# Patient Record
Sex: FEMALE | NOT HISPANIC OR LATINO | URBAN - METROPOLITAN AREA
[De-identification: names, ages, dates, MRNs, and addresses within clinical notes are randomized per-mention and may not be internally consistent; named-entity substitution may affect disease eponyms.]

---

## 2017-02-08 ENCOUNTER — OUTPATIENT (OUTPATIENT)
Dept: OUTPATIENT SERVICES | Facility: HOSPITAL | Age: 27
LOS: 1 days | End: 2017-02-08

## 2017-02-08 DIAGNOSIS — O26.899 OTHER SPECIFIED PREGNANCY RELATED CONDITIONS, UNSPECIFIED TRIMESTER: ICD-10-CM

## 2017-02-08 DIAGNOSIS — Z3A.00 WEEKS OF GESTATION OF PREGNANCY NOT SPECIFIED: ICD-10-CM

## 2017-02-08 RX ORDER — SODIUM CHLORIDE 9 MG/ML
1000 INJECTION, SOLUTION INTRAVENOUS
Qty: 0 | Refills: 0 | Status: DISCONTINUED | OUTPATIENT
Start: 2017-02-08 | End: 2017-02-08

## 2017-02-08 RX ORDER — SODIUM CHLORIDE 9 MG/ML
1000 INJECTION, SOLUTION INTRAVENOUS
Qty: 0 | Refills: 0 | Status: COMPLETED | OUTPATIENT
Start: 2017-02-08 | End: 2017-02-08

## 2017-02-08 RX ADMIN — SODIUM CHLORIDE 999 MILLILITER(S): 9 INJECTION, SOLUTION INTRAVENOUS at 14:49

## 2017-02-09 ENCOUNTER — INPATIENT (INPATIENT)
Facility: HOSPITAL | Age: 27
LOS: 3 days | Discharge: HOME | End: 2017-02-13
Attending: OBSTETRICS & GYNECOLOGY | Admitting: OBSTETRICS & GYNECOLOGY
Payer: MEDICAID

## 2017-02-09 ENCOUNTER — RESULT REVIEW (OUTPATIENT)
Age: 27
End: 2017-02-09

## 2017-02-09 VITALS — WEIGHT: 235.89 LBS | HEIGHT: 64 IN

## 2017-02-09 DIAGNOSIS — Z3A.00 WEEKS OF GESTATION OF PREGNANCY NOT SPECIFIED: ICD-10-CM

## 2017-02-09 DIAGNOSIS — O26.899 OTHER SPECIFIED PREGNANCY RELATED CONDITIONS, UNSPECIFIED TRIMESTER: ICD-10-CM

## 2017-02-09 LAB
BASOPHILS # BLD AUTO: 0.01 K/UL — SIGNIFICANT CHANGE UP (ref 0–0.2)
BASOPHILS NFR BLD AUTO: 0.1 % — SIGNIFICANT CHANGE UP (ref 0–2)
BLD GP AB SCN SERPL QL: NEGATIVE — SIGNIFICANT CHANGE UP
EOSINOPHIL # BLD AUTO: 0.04 K/UL — SIGNIFICANT CHANGE UP (ref 0–0.5)
EOSINOPHIL NFR BLD AUTO: 0.5 % — SIGNIFICANT CHANGE UP (ref 0–6)
HCT VFR BLD CALC: 36.8 % — SIGNIFICANT CHANGE UP (ref 34.5–45)
HGB BLD-MCNC: 12.1 G/DL — SIGNIFICANT CHANGE UP (ref 11.5–15.5)
IMM GRANULOCYTES NFR BLD AUTO: 0.1 % — SIGNIFICANT CHANGE UP (ref 0–1.5)
LYMPHOCYTES # BLD AUTO: 1.51 K/UL — SIGNIFICANT CHANGE UP (ref 1–3.3)
LYMPHOCYTES # BLD AUTO: 19.4 % — SIGNIFICANT CHANGE UP (ref 13–44)
MCHC RBC-ENTMCNC: 29 PG — SIGNIFICANT CHANGE UP (ref 27–34)
MCHC RBC-ENTMCNC: 32.9 % — SIGNIFICANT CHANGE UP (ref 32–36)
MCV RBC AUTO: 88.2 FL — SIGNIFICANT CHANGE UP (ref 80–100)
MONOCYTES # BLD AUTO: 0.87 K/UL — SIGNIFICANT CHANGE UP (ref 0–0.9)
MONOCYTES NFR BLD AUTO: 11.2 % — SIGNIFICANT CHANGE UP (ref 2–14)
NEUTROPHILS # BLD AUTO: 5.36 K/UL — SIGNIFICANT CHANGE UP (ref 1.8–7.4)
NEUTROPHILS NFR BLD AUTO: 68.7 % — SIGNIFICANT CHANGE UP (ref 43–77)
PLATELET # BLD AUTO: 176 K/UL — SIGNIFICANT CHANGE UP (ref 150–400)
PMV BLD: 12.2 FL — SIGNIFICANT CHANGE UP (ref 7–13)
RBC # BLD: 4.17 M/UL — SIGNIFICANT CHANGE UP (ref 3.8–5.2)
RBC # FLD: 16.6 % — HIGH (ref 10.3–14.5)
RH IG SCN BLD-IMP: POSITIVE — SIGNIFICANT CHANGE UP
RH IG SCN BLD-IMP: POSITIVE — SIGNIFICANT CHANGE UP
T PALLIDUM AB TITR SER: NEGATIVE — SIGNIFICANT CHANGE UP
WBC # BLD: 7.8 K/UL — SIGNIFICANT CHANGE UP (ref 3.8–10.5)
WBC # FLD AUTO: 7.8 K/UL — SIGNIFICANT CHANGE UP (ref 3.8–10.5)

## 2017-02-09 RX ORDER — SODIUM CHLORIDE 9 MG/ML
1000 INJECTION, SOLUTION INTRAVENOUS
Qty: 0 | Refills: 0 | Status: DISCONTINUED | OUTPATIENT
Start: 2017-02-09 | End: 2017-02-10

## 2017-02-09 RX ORDER — MORPHINE SULFATE 50 MG/1
4 CAPSULE, EXTENDED RELEASE ORAL ONCE
Qty: 0 | Refills: 0 | Status: DISCONTINUED | OUTPATIENT
Start: 2017-02-09 | End: 2017-02-09

## 2017-02-09 RX ORDER — PENICILLIN G POTASSIUM 5000000 [IU]/1
2.5 POWDER, FOR SOLUTION INTRAMUSCULAR; INTRAPLEURAL; INTRATHECAL; INTRAVENOUS EVERY 4 HOURS
Qty: 0 | Refills: 0 | Status: DISCONTINUED | OUTPATIENT
Start: 2017-02-09 | End: 2017-02-10

## 2017-02-09 RX ORDER — SODIUM CHLORIDE 9 MG/ML
1000 INJECTION, SOLUTION INTRAVENOUS ONCE
Qty: 0 | Refills: 0 | Status: COMPLETED | OUTPATIENT
Start: 2017-02-09 | End: 2017-02-10

## 2017-02-09 RX ORDER — OXYTOCIN 10 UNIT/ML
333.33 VIAL (ML) INJECTION
Qty: 20 | Refills: 0 | Status: DISCONTINUED | OUTPATIENT
Start: 2017-02-09 | End: 2017-02-10

## 2017-02-09 RX ORDER — PENICILLIN G POTASSIUM 5000000 [IU]/1
POWDER, FOR SOLUTION INTRAMUSCULAR; INTRAPLEURAL; INTRATHECAL; INTRAVENOUS
Qty: 0 | Refills: 0 | Status: DISCONTINUED | OUTPATIENT
Start: 2017-02-09 | End: 2017-02-10

## 2017-02-09 RX ORDER — PENICILLIN G POTASSIUM 5000000 [IU]/1
5 POWDER, FOR SOLUTION INTRAMUSCULAR; INTRAPLEURAL; INTRATHECAL; INTRAVENOUS ONCE
Qty: 0 | Refills: 0 | Status: COMPLETED | OUTPATIENT
Start: 2017-02-09 | End: 2017-02-09

## 2017-02-09 RX ADMIN — MORPHINE SULFATE 4 MILLIGRAM(S): 50 CAPSULE, EXTENDED RELEASE ORAL at 20:27

## 2017-02-09 RX ADMIN — MORPHINE SULFATE 4 MILLIGRAM(S): 50 CAPSULE, EXTENDED RELEASE ORAL at 21:37

## 2017-02-09 RX ADMIN — MORPHINE SULFATE 4 MILLIGRAM(S): 50 CAPSULE, EXTENDED RELEASE ORAL at 20:28

## 2017-02-09 RX ADMIN — PENICILLIN G POTASSIUM 200 MILLION UNIT(S): 5000000 POWDER, FOR SOLUTION INTRAMUSCULAR; INTRAPLEURAL; INTRATHECAL; INTRAVENOUS at 18:00

## 2017-02-09 RX ADMIN — PENICILLIN G POTASSIUM 200 MILLION UNIT(S): 5000000 POWDER, FOR SOLUTION INTRAMUSCULAR; INTRAPLEURAL; INTRATHECAL; INTRAVENOUS at 14:00

## 2017-02-09 RX ADMIN — SODIUM CHLORIDE 125 MILLILITER(S): 9 INJECTION, SOLUTION INTRAVENOUS at 14:00

## 2017-02-09 RX ADMIN — PENICILLIN G POTASSIUM 200 MILLION UNIT(S): 5000000 POWDER, FOR SOLUTION INTRAMUSCULAR; INTRAPLEURAL; INTRATHECAL; INTRAVENOUS at 22:00

## 2017-02-09 RX ADMIN — SODIUM CHLORIDE 125 MILLILITER(S): 9 INJECTION, SOLUTION INTRAVENOUS at 23:10

## 2017-02-09 RX ADMIN — SODIUM CHLORIDE 125 MILLILITER(S): 9 INJECTION, SOLUTION INTRAVENOUS at 20:28

## 2017-02-10 LAB
ALBUMIN SERPL ELPH-MCNC: 3.2 G/DL — LOW (ref 3.3–5)
ALP SERPL-CCNC: 194 U/L — HIGH (ref 40–120)
ALT FLD-CCNC: 10 U/L — SIGNIFICANT CHANGE UP (ref 4–33)
APTT BLD: 26.7 SEC — LOW (ref 27.5–37.4)
AST SERPL-CCNC: 16 U/L — SIGNIFICANT CHANGE UP (ref 4–32)
BASOPHILS # BLD AUTO: 0.02 K/UL — SIGNIFICANT CHANGE UP (ref 0–0.2)
BASOPHILS NFR BLD AUTO: 0.2 % — SIGNIFICANT CHANGE UP (ref 0–2)
BILIRUB SERPL-MCNC: 1.3 MG/DL — HIGH (ref 0.2–1.2)
BUN SERPL-MCNC: 4 MG/DL — LOW (ref 7–23)
CALCIUM SERPL-MCNC: 9.6 MG/DL — SIGNIFICANT CHANGE UP (ref 8.4–10.5)
CHLORIDE SERPL-SCNC: 99 MMOL/L — SIGNIFICANT CHANGE UP (ref 98–107)
CO2 SERPL-SCNC: 17 MMOL/L — LOW (ref 22–31)
CREAT SERPL-MCNC: 0.52 MG/DL — SIGNIFICANT CHANGE UP (ref 0.5–1.3)
EOSINOPHIL # BLD AUTO: 0.01 K/UL — SIGNIFICANT CHANGE UP (ref 0–0.5)
EOSINOPHIL NFR BLD AUTO: 0.1 % — SIGNIFICANT CHANGE UP (ref 0–6)
FIBRINOGEN PPP-MCNC: 718 MG/DL — HIGH (ref 255–510)
GLUCOSE SERPL-MCNC: 86 MG/DL — SIGNIFICANT CHANGE UP (ref 70–99)
HCT VFR BLD CALC: 36.7 % — SIGNIFICANT CHANGE UP (ref 34.5–45)
HGB BLD-MCNC: 12.2 G/DL — SIGNIFICANT CHANGE UP (ref 11.5–15.5)
IMM GRANULOCYTES NFR BLD AUTO: 0.2 % — SIGNIFICANT CHANGE UP (ref 0–1.5)
INR BLD: 1.03 — SIGNIFICANT CHANGE UP (ref 0.87–1.18)
LDH SERPL L TO P-CCNC: 199 U/L — SIGNIFICANT CHANGE UP (ref 135–225)
LYMPHOCYTES # BLD AUTO: 1.06 K/UL — SIGNIFICANT CHANGE UP (ref 1–3.3)
LYMPHOCYTES # BLD AUTO: 11.7 % — LOW (ref 13–44)
MCHC RBC-ENTMCNC: 29.3 PG — SIGNIFICANT CHANGE UP (ref 27–34)
MCHC RBC-ENTMCNC: 33.2 % — SIGNIFICANT CHANGE UP (ref 32–36)
MCV RBC AUTO: 88 FL — SIGNIFICANT CHANGE UP (ref 80–100)
MONOCYTES # BLD AUTO: 0.82 K/UL — SIGNIFICANT CHANGE UP (ref 0–0.9)
MONOCYTES NFR BLD AUTO: 9 % — SIGNIFICANT CHANGE UP (ref 2–14)
NEUTROPHILS # BLD AUTO: 7.16 K/UL — SIGNIFICANT CHANGE UP (ref 1.8–7.4)
NEUTROPHILS NFR BLD AUTO: 78.8 % — HIGH (ref 43–77)
PLATELET # BLD AUTO: 158 K/UL — SIGNIFICANT CHANGE UP (ref 150–400)
PMV BLD: 12.2 FL — SIGNIFICANT CHANGE UP (ref 7–13)
POTASSIUM SERPL-MCNC: 3.9 MMOL/L — SIGNIFICANT CHANGE UP (ref 3.5–5.3)
POTASSIUM SERPL-SCNC: 3.9 MMOL/L — SIGNIFICANT CHANGE UP (ref 3.5–5.3)
PROT SERPL-MCNC: 6.9 G/DL — SIGNIFICANT CHANGE UP (ref 6–8.3)
PROTHROM AB SERPL-ACNC: 11.8 SEC — SIGNIFICANT CHANGE UP (ref 10–13.1)
RBC # BLD: 4.17 M/UL — SIGNIFICANT CHANGE UP (ref 3.8–5.2)
RBC # FLD: 16.6 % — HIGH (ref 10.3–14.5)
SODIUM SERPL-SCNC: 138 MMOL/L — SIGNIFICANT CHANGE UP (ref 135–145)
URATE SERPL-MCNC: 5.1 MG/DL — SIGNIFICANT CHANGE UP (ref 2.5–7)
WBC # BLD: 9.09 K/UL — SIGNIFICANT CHANGE UP (ref 3.8–10.5)
WBC # FLD AUTO: 9.09 K/UL — SIGNIFICANT CHANGE UP (ref 3.8–10.5)

## 2017-02-10 PROCEDURE — 88307 TISSUE EXAM BY PATHOLOGIST: CPT | Mod: 26

## 2017-02-10 RX ORDER — OXYTOCIN 10 UNIT/ML
2 VIAL (ML) INJECTION
Qty: 30 | Refills: 0 | Status: DISCONTINUED | OUTPATIENT
Start: 2017-02-10 | End: 2017-02-10

## 2017-02-10 RX ORDER — FERROUS SULFATE 325(65) MG
325 TABLET ORAL EVERY 12 HOURS
Qty: 0 | Refills: 0 | Status: DISCONTINUED | OUTPATIENT
Start: 2017-02-10 | End: 2017-02-13

## 2017-02-10 RX ORDER — DOCUSATE SODIUM 100 MG
100 CAPSULE ORAL
Qty: 0 | Refills: 0 | Status: DISCONTINUED | OUTPATIENT
Start: 2017-02-10 | End: 2017-02-13

## 2017-02-10 RX ORDER — GLYCERIN ADULT
1 SUPPOSITORY, RECTAL RECTAL AT BEDTIME
Qty: 0 | Refills: 0 | Status: DISCONTINUED | OUTPATIENT
Start: 2017-02-10 | End: 2017-02-13

## 2017-02-10 RX ORDER — OXYTOCIN 10 UNIT/ML
333.33 VIAL (ML) INJECTION
Qty: 20 | Refills: 0 | Status: DISCONTINUED | OUTPATIENT
Start: 2017-02-10 | End: 2017-02-12

## 2017-02-10 RX ORDER — DIPHENHYDRAMINE HCL 50 MG
25 CAPSULE ORAL EVERY 6 HOURS
Qty: 0 | Refills: 0 | Status: DISCONTINUED | OUTPATIENT
Start: 2017-02-10 | End: 2017-02-13

## 2017-02-10 RX ORDER — LANOLIN
1 OINTMENT (GRAM) TOPICAL
Qty: 0 | Refills: 0 | Status: DISCONTINUED | OUTPATIENT
Start: 2017-02-10 | End: 2017-02-13

## 2017-02-10 RX ORDER — HEPARIN SODIUM 5000 [USP'U]/ML
5000 INJECTION INTRAVENOUS; SUBCUTANEOUS EVERY 12 HOURS
Qty: 0 | Refills: 0 | Status: DISCONTINUED | OUTPATIENT
Start: 2017-02-10 | End: 2017-02-13

## 2017-02-10 RX ORDER — KETOROLAC TROMETHAMINE 30 MG/ML
30 SYRINGE (ML) INJECTION EVERY 6 HOURS
Qty: 0 | Refills: 0 | Status: DISCONTINUED | OUTPATIENT
Start: 2017-02-10 | End: 2017-02-11

## 2017-02-10 RX ORDER — SODIUM CHLORIDE 9 MG/ML
1000 INJECTION, SOLUTION INTRAVENOUS
Qty: 0 | Refills: 0 | Status: DISCONTINUED | OUTPATIENT
Start: 2017-02-10 | End: 2017-02-13

## 2017-02-10 RX ORDER — OXYCODONE HYDROCHLORIDE 5 MG/1
5 TABLET ORAL
Qty: 0 | Refills: 0 | Status: DISCONTINUED | OUTPATIENT
Start: 2017-02-10 | End: 2017-02-13

## 2017-02-10 RX ORDER — TETANUS TOXOID, REDUCED DIPHTHERIA TOXOID AND ACELLULAR PERTUSSIS VACCINE, ADSORBED 5; 2.5; 8; 8; 2.5 [IU]/.5ML; [IU]/.5ML; UG/.5ML; UG/.5ML; UG/.5ML
0.5 SUSPENSION INTRAMUSCULAR ONCE
Qty: 0 | Refills: 0 | Status: DISCONTINUED | OUTPATIENT
Start: 2017-02-10 | End: 2017-02-13

## 2017-02-10 RX ORDER — OXYTOCIN 10 UNIT/ML
41.67 VIAL (ML) INJECTION
Qty: 20 | Refills: 0 | Status: DISCONTINUED | OUTPATIENT
Start: 2017-02-10 | End: 2017-02-12

## 2017-02-10 RX ORDER — OXYCODONE HYDROCHLORIDE 5 MG/1
5 TABLET ORAL EVERY 4 HOURS
Qty: 0 | Refills: 0 | Status: DISCONTINUED | OUTPATIENT
Start: 2017-02-10 | End: 2017-02-13

## 2017-02-10 RX ORDER — ACETAMINOPHEN 500 MG
975 TABLET ORAL EVERY 6 HOURS
Qty: 0 | Refills: 0 | Status: DISCONTINUED | OUTPATIENT
Start: 2017-02-10 | End: 2017-02-13

## 2017-02-10 RX ORDER — IBUPROFEN 200 MG
600 TABLET ORAL EVERY 6 HOURS
Qty: 0 | Refills: 0 | Status: DISCONTINUED | OUTPATIENT
Start: 2017-02-10 | End: 2017-02-13

## 2017-02-10 RX ORDER — HYDRALAZINE HCL 50 MG
5 TABLET ORAL ONCE
Qty: 0 | Refills: 0 | Status: COMPLETED | OUTPATIENT
Start: 2017-02-10 | End: 2017-02-10

## 2017-02-10 RX ORDER — SIMETHICONE 80 MG/1
80 TABLET, CHEWABLE ORAL EVERY 4 HOURS
Qty: 0 | Refills: 0 | Status: DISCONTINUED | OUTPATIENT
Start: 2017-02-10 | End: 2017-02-13

## 2017-02-10 RX ADMIN — SODIUM CHLORIDE 125 MILLILITER(S): 9 INJECTION, SOLUTION INTRAVENOUS at 04:41

## 2017-02-10 RX ADMIN — PENICILLIN G POTASSIUM 200 MILLION UNIT(S): 5000000 POWDER, FOR SOLUTION INTRAMUSCULAR; INTRAPLEURAL; INTRATHECAL; INTRAVENOUS at 06:00

## 2017-02-10 RX ADMIN — PENICILLIN G POTASSIUM 200 MILLION UNIT(S): 5000000 POWDER, FOR SOLUTION INTRAMUSCULAR; INTRAPLEURAL; INTRATHECAL; INTRAVENOUS at 02:00

## 2017-02-10 RX ADMIN — PENICILLIN G POTASSIUM 200 MILLION UNIT(S): 5000000 POWDER, FOR SOLUTION INTRAMUSCULAR; INTRAPLEURAL; INTRATHECAL; INTRAVENOUS at 10:12

## 2017-02-10 RX ADMIN — Medication 125 MILLIUNIT(S)/MIN: at 21:17

## 2017-02-10 RX ADMIN — PENICILLIN G POTASSIUM 200 MILLION UNIT(S): 5000000 POWDER, FOR SOLUTION INTRAMUSCULAR; INTRAPLEURAL; INTRATHECAL; INTRAVENOUS at 14:58

## 2017-02-10 RX ADMIN — SODIUM CHLORIDE 125 MILLILITER(S): 9 INJECTION, SOLUTION INTRAVENOUS at 06:57

## 2017-02-10 RX ADMIN — Medication 5 MILLIGRAM(S): at 15:14

## 2017-02-10 RX ADMIN — SODIUM CHLORIDE 2000 MILLILITER(S): 9 INJECTION, SOLUTION INTRAVENOUS at 13:00

## 2017-02-10 RX ADMIN — Medication 2 MILLIUNIT(S)/MIN: at 11:01

## 2017-02-11 ENCOUNTER — TRANSCRIPTION ENCOUNTER (OUTPATIENT)
Age: 27
End: 2017-02-11

## 2017-02-11 LAB
BASOPHILS # BLD AUTO: 0.01 K/UL — SIGNIFICANT CHANGE UP (ref 0–0.2)
BASOPHILS NFR BLD AUTO: 0.1 % — SIGNIFICANT CHANGE UP (ref 0–2)
CREAT ?TM UR-MCNC: 120.51 MG/DL — SIGNIFICANT CHANGE UP
EOSINOPHIL # BLD AUTO: 0 K/UL — SIGNIFICANT CHANGE UP (ref 0–0.5)
EOSINOPHIL NFR BLD AUTO: 0 % — SIGNIFICANT CHANGE UP (ref 0–6)
HCT VFR BLD CALC: 30.7 % — LOW (ref 34.5–45)
HGB BLD-MCNC: 10.2 G/DL — LOW (ref 11.5–15.5)
IMM GRANULOCYTES NFR BLD AUTO: 0.4 % — SIGNIFICANT CHANGE UP (ref 0–1.5)
LYMPHOCYTES # BLD AUTO: 1.09 K/UL — SIGNIFICANT CHANGE UP (ref 1–3.3)
LYMPHOCYTES # BLD AUTO: 5.5 % — LOW (ref 13–44)
MCHC RBC-ENTMCNC: 29.1 PG — SIGNIFICANT CHANGE UP (ref 27–34)
MCHC RBC-ENTMCNC: 33.2 % — SIGNIFICANT CHANGE UP (ref 32–36)
MCV RBC AUTO: 87.7 FL — SIGNIFICANT CHANGE UP (ref 80–100)
MONOCYTES # BLD AUTO: 1.55 K/UL — HIGH (ref 0–0.9)
MONOCYTES NFR BLD AUTO: 7.9 % — SIGNIFICANT CHANGE UP (ref 2–14)
NEUTROPHILS # BLD AUTO: 16.97 K/UL — HIGH (ref 1.8–7.4)
NEUTROPHILS NFR BLD AUTO: 86.1 % — HIGH (ref 43–77)
PLATELET # BLD AUTO: 172 K/UL — SIGNIFICANT CHANGE UP (ref 150–400)
PMV BLD: 12.5 FL — SIGNIFICANT CHANGE UP (ref 7–13)
PROT UR-MCNC: 41.7 MG/DL — SIGNIFICANT CHANGE UP
RBC # BLD: 3.5 M/UL — LOW (ref 3.8–5.2)
RBC # FLD: 17.2 % — HIGH (ref 10.3–14.5)
WBC # BLD: 19.69 K/UL — HIGH (ref 3.8–10.5)
WBC # FLD AUTO: 19.69 K/UL — HIGH (ref 3.8–10.5)

## 2017-02-11 RX ADMIN — HEPARIN SODIUM 5000 UNIT(S): 5000 INJECTION INTRAVENOUS; SUBCUTANEOUS at 00:07

## 2017-02-11 RX ADMIN — Medication 975 MILLIGRAM(S): at 16:28

## 2017-02-11 RX ADMIN — HEPARIN SODIUM 5000 UNIT(S): 5000 INJECTION INTRAVENOUS; SUBCUTANEOUS at 22:56

## 2017-02-11 RX ADMIN — Medication 30 MILLIGRAM(S): at 10:54

## 2017-02-11 RX ADMIN — Medication 100 MILLIGRAM(S): at 16:29

## 2017-02-11 RX ADMIN — HEPARIN SODIUM 5000 UNIT(S): 5000 INJECTION INTRAVENOUS; SUBCUTANEOUS at 10:56

## 2017-02-11 NOTE — LACTATION INITIAL EVALUATION - NS LACT CON REASON FOR REQ
primaparous mom/general questions without assessment/less than 24 hours of age, discussed breastfeeding strategies.  Encouraged to breastfeed more often with assistance as needed instead of formula.

## 2017-02-12 ENCOUNTER — TRANSCRIPTION ENCOUNTER (OUTPATIENT)
Age: 27
End: 2017-02-12

## 2017-02-12 LAB
HCT VFR BLD CALC: 28.5 % — LOW (ref 34.5–45)
HGB BLD-MCNC: 9.5 G/DL — LOW (ref 11.5–15.5)
MCHC RBC-ENTMCNC: 29.6 PG — SIGNIFICANT CHANGE UP (ref 27–34)
MCHC RBC-ENTMCNC: 33.3 % — SIGNIFICANT CHANGE UP (ref 32–36)
MCV RBC AUTO: 88.8 FL — SIGNIFICANT CHANGE UP (ref 80–100)
PLATELET # BLD AUTO: 201 K/UL — SIGNIFICANT CHANGE UP (ref 150–400)
PMV BLD: 12.1 FL — SIGNIFICANT CHANGE UP (ref 7–13)
RBC # BLD: 3.21 M/UL — LOW (ref 3.8–5.2)
RBC # FLD: 17.4 % — HIGH (ref 10.3–14.5)
WBC # BLD: 10.64 K/UL — HIGH (ref 3.8–10.5)
WBC # FLD AUTO: 10.64 K/UL — HIGH (ref 3.8–10.5)

## 2017-02-12 RX ORDER — IBUPROFEN 200 MG
1 TABLET ORAL
Qty: 0 | Refills: 0 | DISCHARGE
Start: 2017-02-12

## 2017-02-12 RX ORDER — ACETAMINOPHEN 500 MG
3 TABLET ORAL
Qty: 0 | Refills: 0 | DISCHARGE
Start: 2017-02-12

## 2017-02-12 RX ADMIN — Medication 600 MILLIGRAM(S): at 21:45

## 2017-02-12 RX ADMIN — Medication 600 MILLIGRAM(S): at 07:08

## 2017-02-12 RX ADMIN — Medication 600 MILLIGRAM(S): at 06:39

## 2017-02-12 RX ADMIN — Medication 975 MILLIGRAM(S): at 20:57

## 2017-02-12 RX ADMIN — HEPARIN SODIUM 5000 UNIT(S): 5000 INJECTION INTRAVENOUS; SUBCUTANEOUS at 10:25

## 2017-02-12 RX ADMIN — Medication 100 MILLIGRAM(S): at 06:30

## 2017-02-12 RX ADMIN — HEPARIN SODIUM 5000 UNIT(S): 5000 INJECTION INTRAVENOUS; SUBCUTANEOUS at 22:57

## 2017-02-12 RX ADMIN — Medication 200 MILLIGRAM(S): at 20:50

## 2017-02-12 RX ADMIN — Medication 975 MILLIGRAM(S): at 10:25

## 2017-02-12 RX ADMIN — Medication 600 MILLIGRAM(S): at 20:57

## 2017-02-12 NOTE — DISCHARGE NOTE OB - PATIENT PORTAL LINK FT
“You can access the [employer] Patient Portal, offered by [facility], by registering with the following website: [email or web address]”

## 2017-02-12 NOTE — DISCHARGE NOTE OB - CARE PLAN
Principal Discharge DX:	 delivery delivered  Goal:	resume normal activities  Instructions for follow-up, activity and diet:	nothing pre vaginal for 6 weeks; f/u in office for wound check in 2 weeks

## 2017-02-12 NOTE — DISCHARGE NOTE OB - MEDICATION SUMMARY - MEDICATIONS TO TAKE
I will START or STAY ON the medications listed below when I get home from the hospital:    acetaminophen 325 mg oral tablet  -- 3 tab(s) by mouth every 6 hours  -- Indication: For mild pain     ibuprofen 600 mg oral tablet  -- 1 tab(s) by mouth every 6 hours  -- Indication: For moderate pain     Prenatal Multivitamins with Folic Acid 1 mg oral tablet  -- 1 tab(s) by mouth once a day  -- Indication: For post partum

## 2017-02-12 NOTE — DISCHARGE NOTE OB - HOSPITAL COURSE
Patient admitted to hospital for inductio of labor for post dates and non reactive fetal non stress test. Had primary  section for non reassuring fetal status. Uncomplicated surgery and post partum course. Patient discharged home on POD#3 in stable condition.

## 2017-02-12 NOTE — DISCHARGE NOTE OB - CARE PROVIDER_API CALL
Keisha Kaur), Obstetrics and Gynecology  [address] [place] [address]  Phone: [phone]  Fax: [phone]

## 2017-02-13 ENCOUNTER — APPOINTMENT (OUTPATIENT)
Dept: ANTEPARTUM | Facility: CLINIC | Age: 27
End: 2017-02-13

## 2017-02-13 VITALS
DIASTOLIC BLOOD PRESSURE: 70 MMHG | RESPIRATION RATE: 18 BRPM | OXYGEN SATURATION: 100 % | HEART RATE: 87 BPM | SYSTOLIC BLOOD PRESSURE: 118 MMHG | TEMPERATURE: 98 F

## 2017-02-13 RX ADMIN — Medication 975 MILLIGRAM(S): at 08:29

## 2017-02-13 RX ADMIN — Medication 600 MILLIGRAM(S): at 08:29

## 2017-02-13 RX ADMIN — Medication 600 MILLIGRAM(S): at 09:18

## 2017-02-13 RX ADMIN — Medication 325 MILLIGRAM(S): at 08:31

## 2017-02-13 RX ADMIN — Medication 100 MILLIGRAM(S): at 06:54

## 2017-02-13 RX ADMIN — Medication 200 MILLIGRAM(S): at 06:54

## 2019-03-18 ENCOUNTER — ASOB RESULT (OUTPATIENT)
Age: 29
End: 2019-03-18

## 2019-03-18 ENCOUNTER — APPOINTMENT (OUTPATIENT)
Dept: ANTEPARTUM | Facility: CLINIC | Age: 29
End: 2019-03-18
Payer: MEDICAID

## 2019-03-18 PROCEDURE — 36416 COLLJ CAPILLARY BLOOD SPEC: CPT

## 2019-03-18 PROCEDURE — 76813 OB US NUCHAL MEAS 1 GEST: CPT

## 2019-03-18 PROCEDURE — 76801 OB US < 14 WKS SINGLE FETUS: CPT

## 2019-04-23 ENCOUNTER — ASOB RESULT (OUTPATIENT)
Age: 29
End: 2019-04-23

## 2019-04-23 ENCOUNTER — APPOINTMENT (OUTPATIENT)
Dept: ANTEPARTUM | Facility: CLINIC | Age: 29
End: 2019-04-23
Payer: MEDICAID

## 2019-04-23 PROCEDURE — 99213 OFFICE O/P EST LOW 20 MIN: CPT | Mod: 25

## 2019-04-23 PROCEDURE — 76811 OB US DETAILED SNGL FETUS: CPT

## 2019-04-24 ENCOUNTER — TRANSCRIPTION ENCOUNTER (OUTPATIENT)
Age: 29
End: 2019-04-24

## 2019-04-29 ENCOUNTER — APPOINTMENT (OUTPATIENT)
Dept: ANTEPARTUM | Facility: CLINIC | Age: 29
End: 2019-04-29

## 2019-05-01 ENCOUNTER — APPOINTMENT (OUTPATIENT)
Dept: ANTEPARTUM | Facility: CLINIC | Age: 29
End: 2019-05-01
Payer: MEDICAID

## 2019-05-01 ENCOUNTER — ASOB RESULT (OUTPATIENT)
Age: 29
End: 2019-05-01

## 2019-05-01 PROCEDURE — 76816 OB US FOLLOW-UP PER FETUS: CPT

## 2019-05-01 PROCEDURE — 99213 OFFICE O/P EST LOW 20 MIN: CPT | Mod: 25

## 2019-05-01 PROCEDURE — 36415 COLL VENOUS BLD VENIPUNCTURE: CPT

## 2019-08-27 ENCOUNTER — ASOB RESULT (OUTPATIENT)
Age: 29
End: 2019-08-27

## 2019-08-27 ENCOUNTER — APPOINTMENT (OUTPATIENT)
Dept: ANTEPARTUM | Facility: CLINIC | Age: 29
End: 2019-08-27
Payer: MEDICAID

## 2019-08-27 PROCEDURE — 76819 FETAL BIOPHYS PROFIL W/O NST: CPT

## 2019-08-27 PROCEDURE — 76816 OB US FOLLOW-UP PER FETUS: CPT

## 2019-09-11 ENCOUNTER — TRANSCRIPTION ENCOUNTER (OUTPATIENT)
Age: 29
End: 2019-09-11

## 2019-09-11 ENCOUNTER — OUTPATIENT (OUTPATIENT)
Dept: OUTPATIENT SERVICES | Facility: HOSPITAL | Age: 29
LOS: 1 days | End: 2019-09-11

## 2019-09-11 VITALS
TEMPERATURE: 97 F | RESPIRATION RATE: 16 BRPM | DIASTOLIC BLOOD PRESSURE: 80 MMHG | HEART RATE: 79 BPM | SYSTOLIC BLOOD PRESSURE: 100 MMHG | OXYGEN SATURATION: 99 % | HEIGHT: 65 IN | WEIGHT: 250 LBS

## 2019-09-11 DIAGNOSIS — Z98.891 HISTORY OF UTERINE SCAR FROM PREVIOUS SURGERY: Chronic | ICD-10-CM

## 2019-09-11 DIAGNOSIS — O34.219 MATERNAL CARE FOR UNSPECIFIED TYPE SCAR FROM PREVIOUS CESAREAN DELIVERY: ICD-10-CM

## 2019-09-11 LAB
APPEARANCE UR: CLEAR — SIGNIFICANT CHANGE UP
BILIRUB UR-MCNC: NEGATIVE — SIGNIFICANT CHANGE UP
BLD GP AB SCN SERPL QL: NEGATIVE — SIGNIFICANT CHANGE UP
BLOOD UR QL VISUAL: NEGATIVE — SIGNIFICANT CHANGE UP
COLOR SPEC: SIGNIFICANT CHANGE UP
GLUCOSE UR-MCNC: NEGATIVE — SIGNIFICANT CHANGE UP
HCT VFR BLD CALC: 32.6 % — LOW (ref 34.5–45)
HGB BLD-MCNC: 10.2 G/DL — LOW (ref 11.5–15.5)
KETONES UR-MCNC: NEGATIVE — SIGNIFICANT CHANGE UP
LEUKOCYTE ESTERASE UR-ACNC: NEGATIVE — SIGNIFICANT CHANGE UP
MCHC RBC-ENTMCNC: 27.9 PG — SIGNIFICANT CHANGE UP (ref 27–34)
MCHC RBC-ENTMCNC: 31.3 % — LOW (ref 32–36)
MCV RBC AUTO: 89.3 FL — SIGNIFICANT CHANGE UP (ref 80–100)
NITRITE UR-MCNC: NEGATIVE — SIGNIFICANT CHANGE UP
NRBC # FLD: 0 K/UL — SIGNIFICANT CHANGE UP (ref 0–0)
PH UR: 6.5 — SIGNIFICANT CHANGE UP (ref 5–8)
PLATELET # BLD AUTO: 212 K/UL — SIGNIFICANT CHANGE UP (ref 150–400)
PMV BLD: 12.3 FL — SIGNIFICANT CHANGE UP (ref 7–13)
PROT UR-MCNC: NEGATIVE — SIGNIFICANT CHANGE UP
RBC # BLD: 3.65 M/UL — LOW (ref 3.8–5.2)
RBC # FLD: 14.7 % — HIGH (ref 10.3–14.5)
RH IG SCN BLD-IMP: POSITIVE — SIGNIFICANT CHANGE UP
SP GR SPEC: 1.01 — SIGNIFICANT CHANGE UP (ref 1–1.04)
T PALLIDUM AB TITR SER: NEGATIVE — SIGNIFICANT CHANGE UP
UROBILINOGEN FLD QL: NORMAL — SIGNIFICANT CHANGE UP
WBC # BLD: 5.66 K/UL — SIGNIFICANT CHANGE UP (ref 3.8–10.5)
WBC # FLD AUTO: 5.66 K/UL — SIGNIFICANT CHANGE UP (ref 3.8–10.5)

## 2019-09-11 NOTE — OB PST NOTE - NSHPREVIEWOFSYSTEMS_GEN_ALL_CORE
General: No fever, chills, sweating, anorexia, weight loss or weight gain. No polyphagia, polyuria, polydipsia, malaise, or fatigue    Skin: No rashes, itching, or dryness. No change in size/color of moles. No tumors, brittle nails, pitted nails, or hair loss    Breast: No tenderness, lumps, or nipple discharge      Ophthalmologic: No diplopia, photophobia, lacrimation, blurred Vision , or eye discharge    ENMT Symptoms: No hearing difficulty, ear pain, tinnitus, or vertigo. No sinus symptoms, nasal congestion, nasal   discharge, or nasal obstruction    Respiratory and Thorax: No wheezing, dyspnea, cough, hemoptysis, or pleuritic chest pain     Cardiovascular: swelling to bilateral LE. No chest pain, palpitations, dyspnea on exertion, orthopnea, paroxysmal nocturnal dyspnea, or claudication    Gastrointestinal: No nausea, vomiting, diarrhea, constipation, change in bowel habits, flatulence, abdominal pain, or melena    Genitourinary/ Pelvis: No hematuria, renal colic, or flank pain.  No urine discoloration, incontinence, dysuria, or urinary hesitancy. Normal urinary frequency. No nocturia, abnormal vaginal bleeding, vaginal discharge, spotting, pelvic pain, or vaginal leakage    Musculoskeletal: No arthralgia, arthritis, joint swelling, muscle cramping, muscle weakness, neck pain, arm pain, or leg pain    Neurological: No transient paralysis, weakness, paresthesias, or seizures. No syncope, tremors, vertigo, loss of sensation, difficulty walking, loss of consciousness, hemiparesis, confusion, or facial palsy    Psychiatric: No suicidal ideation, depression, anxiety, insomnia, memory loss, paranoia, mood swings, agitation, hallucinations, or hyperactivity    Hematology: anemia with pregnancy. On iron supplements. No gum bleeding, nose bleeding, or skin lumps    Lymphatic: No enlarged or tender lymph nodes.     Endocrine: No heat or cold intolerance    Immunologic: No recurrent or persistent infections

## 2019-09-11 NOTE — OB PST NOTE - NSHPPHYSICALEXAM_GEN_ALL_CORE
Constitutional: Well Developed, Well Groomed, Well Nourished, No Distress    Eyes: PERRL, EOMI, conjunctiva clear    Ears: Normal    Mouth & Gums: Normal, moist    Pharynx: No tenderness, discharge, or peritonsillar abscess    Tonsils: No Redness, discharge, tenderness, or swelling    Neck: Supple, no JVD, normal thyroid glands, no carotid bruits, no cervical vertebral or paraspinal tenderness    Breast: Normal shape, no masses, no tenderness, nipples normal, no nipple discharge    Back: Normal shape, ROM intact, strength intact, no vertebral tenderness    Respiratory: Airway patent, breath sounds equal, good air movement, respiration non-labored, clear to auscultation bilateral, no chest wall tenderness, no intercostal retractions, no rales, no wheezes, no rhonchi, no subcutaneous emphysema    Cardiovascular:  Regular rate and rhythm, no rubs or murmur, normal PMI    Gastrointestinal: Soft, non-tender, non distention, no masses palpable, bowel sound normal, no bruit, no rebound tenderness    Extremities: No clubbing, cyanosis, or pedal edema    Vascular:  Carotid Pulse normal , Radial Pulse normal, Femoral Pulse normal, DP pulse normal, PT pulse normal    Neurological: alert & oriented x 3, sensation intact, deep reflexes intact, cranial nerve intact, normal strength    Skin: warm and dry, normal color    Lymph Nodes: normal posterior cervical lymph node, normal anterior cervical lymph node, normal supraclavicular lymph node, normal axillary lymph node, normal inguinal lymph node, normal femoral lymph node    Musculoskeletal: ROM intact, no joint swelling, warmth, or calf tenderness. Normal strength    Psychiatric: normal affect, normal behavior Constitutional: Well Developed, Well Groomed, Well Nourished, No Distress    Eyes: PERRL, EOMI, conjunctiva clear    Ears: Normal    Mouth & Gums: Normal, moist    Pharynx: No tenderness, discharge, or peritonsillar abscess    Tonsils: No Redness, discharge, tenderness, or swelling    Neck: Supple, no JVD, normal thyroid glands, no carotid bruits, no cervical vertebral or paraspinal tenderness    Breast: Normal shape    Back: Normal shape, ROM intact, strength intact, no vertebral tenderness    Respiratory: Airway patent, breath sounds equal, good air movement, respiration non-labored, clear to auscultation bilateral, no chest wall tenderness, no intercostal retractions, no rales, no wheezes, no rhonchi    Cardiovascular:  Regular rate and rhythm, no rubs or murmur, normal PMI    Gastrointestinal: Pregnant abdomen. Pt reports fetal movement during PST interview     Extremities: Swelling to anju LE, non pitting. No clubbing, cyanosis    Vascular: Radial Pulse normal    Neurological: alert & oriented x 3, sensation intact, deep reflexes intact, cranial nerve intact, normal strength    Skin: warm and dry, normal color    Lymph Nodes: normal posterior cervical lymph node, normal anterior cervical lymph node, normal supraclavicular lymph node     Musculoskeletal: ROM intact, no joint swelling, warmth, or calf tenderness. Normal strength    Psychiatric: normal affect, normal behavior

## 2019-09-11 NOTE — OB PST NOTE - HISTORY OF PRESENT ILLNESS
30 y/o female  presents to PST at 39 weeks pregnant with a SONIA of 2019. Scheduled for Repeat  Section on . h/o emergency  section in 2017 at 40 weeks due to non progressive labor. Pt states fetus is transverse and therefore repeat  section is being performed.

## 2019-09-11 NOTE — OB PST NOTE - NSANTHOSAYNRD_GEN_A_CORE
No. DILLAN screening performed.  STOP BANG Legend: 0-2 = LOW Risk; 3-4 = INTERMEDIATE Risk; 5-8 = HIGH Risk

## 2019-09-11 NOTE — OB PST NOTE - PROBLEM SELECTOR PLAN 1
Scheduled for Repeat  Section on .  Preop instructions given, pt verbalized understanding   Chlorhexidine wash with instructions provided- pt verbalized understanding using teach back method   Pt instructed to hold prenatal MVI tonight   STAT labs pending (CBC, Type, UA and RPR)

## 2019-09-12 ENCOUNTER — INPATIENT (INPATIENT)
Facility: HOSPITAL | Age: 29
LOS: 2 days | Discharge: HOME | End: 2019-09-15
Attending: OBSTETRICS & GYNECOLOGY | Admitting: OBSTETRICS & GYNECOLOGY

## 2019-09-12 VITALS
RESPIRATION RATE: 12 BRPM | HEART RATE: 98 BPM | WEIGHT: 246.04 LBS | TEMPERATURE: 98 F | DIASTOLIC BLOOD PRESSURE: 72 MMHG | HEIGHT: 65 IN | SYSTOLIC BLOOD PRESSURE: 134 MMHG

## 2019-09-12 DIAGNOSIS — Z98.891 HISTORY OF UTERINE SCAR FROM PREVIOUS SURGERY: Chronic | ICD-10-CM

## 2019-09-12 DIAGNOSIS — O34.219 MATERNAL CARE FOR UNSPECIFIED TYPE SCAR FROM PREVIOUS CESAREAN DELIVERY: ICD-10-CM

## 2019-09-12 DIAGNOSIS — Z98.891 HISTORY OF UTERINE SCAR FROM PREVIOUS SURGERY: ICD-10-CM

## 2019-09-12 RX ORDER — LANOLIN
1 OINTMENT (GRAM) TOPICAL EVERY 6 HOURS
Refills: 0 | Status: DISCONTINUED | OUTPATIENT
Start: 2019-09-12 | End: 2019-09-15

## 2019-09-12 RX ORDER — GLYCERIN ADULT
1 SUPPOSITORY, RECTAL RECTAL AT BEDTIME
Refills: 0 | Status: DISCONTINUED | OUTPATIENT
Start: 2019-09-12 | End: 2019-09-15

## 2019-09-12 RX ORDER — DOCUSATE SODIUM 100 MG
100 CAPSULE ORAL
Refills: 0 | Status: DISCONTINUED | OUTPATIENT
Start: 2019-09-12 | End: 2019-09-15

## 2019-09-12 RX ORDER — INFLUENZA VIRUS VACCINE 15; 15; 15; 15 UG/.5ML; UG/.5ML; UG/.5ML; UG/.5ML
0.5 SUSPENSION INTRAMUSCULAR ONCE
Refills: 0 | Status: COMPLETED | OUTPATIENT
Start: 2019-09-12 | End: 2019-09-14

## 2019-09-12 RX ORDER — OXYTOCIN 10 UNIT/ML
333.33 VIAL (ML) INJECTION
Qty: 20 | Refills: 0 | Status: DISCONTINUED | OUTPATIENT
Start: 2019-09-12 | End: 2019-09-12

## 2019-09-12 RX ORDER — KETOROLAC TROMETHAMINE 30 MG/ML
30 SYRINGE (ML) INJECTION EVERY 6 HOURS
Refills: 0 | Status: DISCONTINUED | OUTPATIENT
Start: 2019-09-12 | End: 2019-09-14

## 2019-09-12 RX ORDER — SODIUM CHLORIDE 9 MG/ML
1000 INJECTION, SOLUTION INTRAVENOUS ONCE
Refills: 0 | Status: COMPLETED | OUTPATIENT
Start: 2019-09-12 | End: 2019-09-12

## 2019-09-12 RX ORDER — SIMETHICONE 80 MG/1
80 TABLET, CHEWABLE ORAL EVERY 4 HOURS
Refills: 0 | Status: DISCONTINUED | OUTPATIENT
Start: 2019-09-12 | End: 2019-09-15

## 2019-09-12 RX ORDER — IBUPROFEN 200 MG
600 TABLET ORAL EVERY 6 HOURS
Refills: 0 | Status: COMPLETED | OUTPATIENT
Start: 2019-09-12 | End: 2020-08-10

## 2019-09-12 RX ORDER — TETANUS TOXOID, REDUCED DIPHTHERIA TOXOID AND ACELLULAR PERTUSSIS VACCINE, ADSORBED 5; 2.5; 8; 8; 2.5 [IU]/.5ML; [IU]/.5ML; UG/.5ML; UG/.5ML; UG/.5ML
0.5 SUSPENSION INTRAMUSCULAR ONCE
Refills: 0 | Status: COMPLETED | OUTPATIENT
Start: 2019-09-12

## 2019-09-12 RX ORDER — SODIUM CHLORIDE 9 MG/ML
1000 INJECTION, SOLUTION INTRAVENOUS
Refills: 0 | Status: DISCONTINUED | OUTPATIENT
Start: 2019-09-12 | End: 2019-09-12

## 2019-09-12 RX ORDER — HEPARIN SODIUM 5000 [USP'U]/ML
10000 INJECTION INTRAVENOUS; SUBCUTANEOUS EVERY 8 HOURS
Refills: 0 | Status: DISCONTINUED | OUTPATIENT
Start: 2019-09-12 | End: 2019-09-15

## 2019-09-12 RX ORDER — MAGNESIUM HYDROXIDE 400 MG/1
30 TABLET, CHEWABLE ORAL
Refills: 0 | Status: DISCONTINUED | OUTPATIENT
Start: 2019-09-12 | End: 2019-09-15

## 2019-09-12 RX ORDER — CITRIC ACID/SODIUM CITRATE 300-500 MG
30 SOLUTION, ORAL ORAL ONCE
Refills: 0 | Status: COMPLETED | OUTPATIENT
Start: 2019-09-12 | End: 2019-09-12

## 2019-09-12 RX ORDER — ACETAMINOPHEN 500 MG
975 TABLET ORAL
Refills: 0 | Status: DISCONTINUED | OUTPATIENT
Start: 2019-09-12 | End: 2019-09-15

## 2019-09-12 RX ORDER — SODIUM CHLORIDE 9 MG/ML
1000 INJECTION, SOLUTION INTRAVENOUS
Refills: 0 | Status: DISCONTINUED | OUTPATIENT
Start: 2019-09-12 | End: 2019-09-13

## 2019-09-12 RX ORDER — DIPHENHYDRAMINE HCL 50 MG
25 CAPSULE ORAL EVERY 6 HOURS
Refills: 0 | Status: DISCONTINUED | OUTPATIENT
Start: 2019-09-12 | End: 2019-09-15

## 2019-09-12 RX ORDER — OXYCODONE HYDROCHLORIDE 5 MG/1
5 TABLET ORAL
Refills: 0 | Status: DISCONTINUED | OUTPATIENT
Start: 2019-09-12 | End: 2019-09-15

## 2019-09-12 RX ORDER — OXYCODONE HYDROCHLORIDE 5 MG/1
5 TABLET ORAL ONCE
Refills: 0 | Status: DISCONTINUED | OUTPATIENT
Start: 2019-09-12 | End: 2019-09-15

## 2019-09-12 RX ORDER — METOCLOPRAMIDE HCL 10 MG
10 TABLET ORAL ONCE
Refills: 0 | Status: COMPLETED | OUTPATIENT
Start: 2019-09-12 | End: 2019-09-12

## 2019-09-12 RX ORDER — FAMOTIDINE 10 MG/ML
20 INJECTION INTRAVENOUS ONCE
Refills: 0 | Status: COMPLETED | OUTPATIENT
Start: 2019-09-12 | End: 2019-09-12

## 2019-09-12 RX ORDER — OXYTOCIN 10 UNIT/ML
333.33 VIAL (ML) INJECTION
Qty: 20 | Refills: 0 | Status: DISCONTINUED | OUTPATIENT
Start: 2019-09-12 | End: 2019-09-13

## 2019-09-12 RX ADMIN — SODIUM CHLORIDE 2000 MILLILITER(S): 9 INJECTION, SOLUTION INTRAVENOUS at 09:41

## 2019-09-12 RX ADMIN — SODIUM CHLORIDE 75 MILLILITER(S): 9 INJECTION, SOLUTION INTRAVENOUS at 13:09

## 2019-09-12 RX ADMIN — Medication 10 MILLIGRAM(S): at 09:41

## 2019-09-12 RX ADMIN — Medication 30 MILLIGRAM(S): at 19:30

## 2019-09-12 RX ADMIN — HEPARIN SODIUM 10000 UNIT(S): 5000 INJECTION INTRAVENOUS; SUBCUTANEOUS at 18:55

## 2019-09-12 RX ADMIN — Medication 30 MILLILITER(S): at 09:41

## 2019-09-12 RX ADMIN — Medication 1000 MILLIUNIT(S)/MIN: at 13:09

## 2019-09-12 RX ADMIN — FAMOTIDINE 20 MILLIGRAM(S): 10 INJECTION INTRAVENOUS at 09:41

## 2019-09-12 RX ADMIN — Medication 30 MILLIGRAM(S): at 19:00

## 2019-09-12 RX ADMIN — SODIUM CHLORIDE 200 MILLILITER(S): 9 INJECTION, SOLUTION INTRAVENOUS at 09:41

## 2019-09-12 NOTE — OB PROVIDER H&P - ASSESSMENT
29 female  EDC/19  presents@ 39.2 wks for scheduled  rltcs.  +AP course transverse lie , otherwise unremarkable.   Denies VB,ROM or UCs today.  +FM

## 2019-09-12 NOTE — OB PROVIDER H&P - NSHPPHYSICALEXAM_GEN_ALL_CORE
T(C): 36.8 (09-12-19 @ 09:05), Max: 36.8 (09-12-19 @ 09:03)  HR: 98 (09-12-19 @ 09:04) (98 - 98)  BP: 134/72 (09-12-19 @ 09:04) (134/72 - 134/72)  RR: 12 (09-12-19 @ 09:03) (12 - 12)  SpO2: --Height (cm): 165.1 (09-12-19 @ 09:03)  Weight (kg): 111.6 (09-12-19 @ 09:03)  BMI (kg/m2): 40.9 (09-12-19 @ 09:03)  BSA (m2): 2.16 (09-12-19 @ 09:03)    A&Ox3  Heart: RRR, S1&S2, no S3  Lungs: Clear bilateral to auscultation, good inspiratory /expiratory effort              no rhonchi, no rales  Abd: soft, Gravid, TOCO in place           +pfannenstial scar/no scar/keloid/hypertrophied  EFM:  132 bpm/moderate variabilty/+accelerations/no decelerations/CAT 1  TOCO:  no UC  Ext:  FROM / no edema  Neuro: grossly intact T(C): 36.8 (09-12-19 @ 09:05), Max: 36.8 (09-12-19 @ 09:03)  HR: 98 (09-12-19 @ 09:04) (98 - 98)  BP: 134/72 (09-12-19 @ 09:04) (134/72 - 134/72)  RR: 12 (09-12-19 @ 09:03) (12 - 12)  SpO2: --Height (cm): 165.1 (09-12-19 @ 09:03)  Weight (kg): 111.6 (09-12-19 @ 09:03)  BMI (kg/m2): 40.9 (09-12-19 @ 09:03)  BSA (m2): 2.16 (09-12-19 @ 09:03)    A&Ox3  Heart: RRR, S1&S2, no S3  Lungs: Clear bilateral to auscultation, good inspiratory /expiratory effort              no rhonchi, no rales  Abd: soft, Gravid, TOCO in place           +pfannenstial scar/no scar/keloid/hypertrophied  EFM:  132 bpm/moderate variabilty/+accelerations/no decelerations/CAT 1  TOCO:  no UC  Ext:  FROM / no edema  Neuro: grossly intact    TLTAS- Vx presentation/fundal placenta

## 2019-09-12 NOTE — OB PROVIDER H&P - NSHPREVIEWOFSYSTEMS_GEN_ALL_CORE
A&Ox3  CARDIOVASCULAR:  denies murmurs or h/o cardio myopathy, denies palpitations, chest pain or LOC  RESPIRATORY: denies cough, wheeze or SOB  HEMATOLOGICAL: denies blood clotting disorder, h/o PE or DVT, h/o Blood Transfusion

## 2019-09-12 NOTE — OB PROVIDER DELIVERY SUMMARY - NSPROVIDERDELIVERYNOTE_OBGYN_ALL_OB_FT
low transverse uterine incision, bladder flap created prior. Fetus in oblique position delivered as vertex atraumatically, Viable girl, delayed cord clamping, handed to peds, APGARS 8/8. Placenta delivered manually with 3 vessel cord. Hysterotomy repaired with double layer, muscle reapproximated. Fascia and SQ closed. Skin closed with suture. Baby and mother in good condition transferred to PACU.

## 2019-09-12 NOTE — OB PROVIDER H&P - HISTORY OF PRESENT ILLNESS
28yo female  EDC   presents  39.2wks for scheduled  rltcs.  +AP course transverse lie, otherwise unremarkable.   Denies VB,ROM or UCs today.  +FM

## 2019-09-12 NOTE — BRIEF OPERATIVE NOTE - OPERATION/FINDINGS
Grossly normal uterus, tubes, ovaries. Oblique presentation of live female infant. Apgars 8,8. Uterus clsoed in two layers. Good hemostasis noted. 900/2400/225

## 2019-09-12 NOTE — OB NEONATOLOGY/PEDIATRICIAN DELIVERY SUMMARY - NSPEDSNEONOTESA_OBGYN_ALL_OB_FT
Peds called for scheduled repeat CS.  Mother is a 30 yo  at 39+2 weeks gestation. PMH of anemia on iron. PNC is unremarkable. Labs B+, negative/NR/immune. GBS POSITIVE from . No labor or rupture. AROM at delivery, clear. Delivered vertex. Delayed cord clamping for 30 secs. W/D/S/S. APGAR 9/9.   Mother desires formula and breastfeeding, wants hepB. Peds Dr. Denice Dacosta. Peds called for scheduled repeat CS.  Mother is a 28 yo  at 39+2 weeks gestation. PMH of anemia on iron. PNC is unremarkable. Labs B+, negative/NR/immune. GBS POSITIVE from . No labor or rupture. AROM at delivery, clear. Delivered vertex. Delayed cord clamping for 30 secs. W/D/S/S. APGAR 9/9. 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  Mother desires formula and breastfeeding, wants hepB. Peds Dr. Denice Dacosta. Peds called for scheduled repeat CS.  Mother is a 28 yo  at 39+2 weeks gestation. PMH of anemia on iron. PNC is unremarkable. Labs B+, negative/NR/immune. GBS POSITIVE from . No labor or rupture. AROM at delivery, clear. Delivered vertex. Delayed cord clamping for 30 secs. W/D/S/S. APGAR 8/8. 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  Mother desires formula and breastfeeding, wants hepB. Peds Dr. Denice Dacosta.

## 2019-09-13 LAB
BASOPHILS # BLD AUTO: 0.02 K/UL — SIGNIFICANT CHANGE UP (ref 0–0.2)
BASOPHILS NFR BLD AUTO: 0.2 % — SIGNIFICANT CHANGE UP (ref 0–2)
EOSINOPHIL # BLD AUTO: 0.03 K/UL — SIGNIFICANT CHANGE UP (ref 0–0.5)
EOSINOPHIL NFR BLD AUTO: 0.3 % — SIGNIFICANT CHANGE UP (ref 0–6)
HCT VFR BLD CALC: 30.4 % — LOW (ref 34.5–45)
HGB BLD-MCNC: 9.4 G/DL — LOW (ref 11.5–15.5)
IMM GRANULOCYTES NFR BLD AUTO: 0.6 % — SIGNIFICANT CHANGE UP (ref 0–1.5)
LYMPHOCYTES # BLD AUTO: 1.96 K/UL — SIGNIFICANT CHANGE UP (ref 1–3.3)
LYMPHOCYTES # BLD AUTO: 18.4 % — SIGNIFICANT CHANGE UP (ref 13–44)
MCHC RBC-ENTMCNC: 27.7 PG — SIGNIFICANT CHANGE UP (ref 27–34)
MCHC RBC-ENTMCNC: 30.9 % — LOW (ref 32–36)
MCV RBC AUTO: 89.7 FL — SIGNIFICANT CHANGE UP (ref 80–100)
MONOCYTES # BLD AUTO: 1.11 K/UL — HIGH (ref 0–0.9)
MONOCYTES NFR BLD AUTO: 10.4 % — SIGNIFICANT CHANGE UP (ref 2–14)
NEUTROPHILS # BLD AUTO: 7.46 K/UL — HIGH (ref 1.8–7.4)
NEUTROPHILS NFR BLD AUTO: 70.1 % — SIGNIFICANT CHANGE UP (ref 43–77)
NRBC # FLD: 0 K/UL — SIGNIFICANT CHANGE UP (ref 0–0)
PLATELET # BLD AUTO: 193 K/UL — SIGNIFICANT CHANGE UP (ref 150–400)
PMV BLD: 12.5 FL — SIGNIFICANT CHANGE UP (ref 7–13)
RBC # BLD: 3.39 M/UL — LOW (ref 3.8–5.2)
RBC # FLD: 14.5 % — SIGNIFICANT CHANGE UP (ref 10.3–14.5)
WBC # BLD: 10.64 K/UL — HIGH (ref 3.8–10.5)
WBC # FLD AUTO: 10.64 K/UL — HIGH (ref 3.8–10.5)

## 2019-09-13 RX ORDER — IBUPROFEN 200 MG
600 TABLET ORAL EVERY 6 HOURS
Refills: 0 | Status: DISCONTINUED | OUTPATIENT
Start: 2019-09-13 | End: 2019-09-15

## 2019-09-13 RX ADMIN — Medication 600 MILLIGRAM(S): at 23:40

## 2019-09-13 RX ADMIN — Medication 100 MILLIGRAM(S): at 12:10

## 2019-09-13 RX ADMIN — Medication 600 MILLIGRAM(S): at 12:11

## 2019-09-13 RX ADMIN — Medication 600 MILLIGRAM(S): at 00:00

## 2019-09-13 RX ADMIN — Medication 600 MILLIGRAM(S): at 06:40

## 2019-09-13 RX ADMIN — Medication 600 MILLIGRAM(S): at 13:00

## 2019-09-13 RX ADMIN — Medication 600 MILLIGRAM(S): at 00:28

## 2019-09-13 RX ADMIN — Medication 600 MILLIGRAM(S): at 07:30

## 2019-09-13 RX ADMIN — Medication 600 MILLIGRAM(S): at 18:00

## 2019-09-13 RX ADMIN — Medication 600 MILLIGRAM(S): at 17:13

## 2019-09-13 RX ADMIN — HEPARIN SODIUM 10000 UNIT(S): 5000 INJECTION INTRAVENOUS; SUBCUTANEOUS at 06:39

## 2019-09-13 RX ADMIN — HEPARIN SODIUM 10000 UNIT(S): 5000 INJECTION INTRAVENOUS; SUBCUTANEOUS at 17:13

## 2019-09-13 RX ADMIN — Medication 100 MILLIGRAM(S): at 00:06

## 2019-09-13 RX ADMIN — Medication 600 MILLIGRAM(S): at 01:30

## 2019-09-13 RX ADMIN — HEPARIN SODIUM 10000 UNIT(S): 5000 INJECTION INTRAVENOUS; SUBCUTANEOUS at 23:41

## 2019-09-13 RX ADMIN — SIMETHICONE 80 MILLIGRAM(S): 80 TABLET, CHEWABLE ORAL at 00:06

## 2019-09-13 NOTE — PROGRESS NOTE ADULT - SUBJECTIVE AND OBJECTIVE BOX
OB Attending on call: Full note to follow    Postpartum Note,  Section  She is a  29y woman who is now post-operative day:     Subjective:  The patient feels well.  She is ambulating.   She is tolerating regular diet.  She denies nausea and vomiting.  She is voiding.  Her pain is controlled.  She reports normal postpartum bleeding.  She is breastfeeding.  She is formula feeding.    Physical exam:    Vital Signs Last 24 Hrs  T(C): 36.7 (13 Sep 2019 14:01), Max: 36.8 (13 Sep 2019 02:00)  T(F): 98.1 (13 Sep 2019 14:01), Max: 98.2 (13 Sep 2019 02:00)  HR: 76 (13 Sep 2019 14:01) (71 - 84)  BP: 105/57 (13 Sep 2019 14:01) (105/57 - 112/64)  BP(mean): --  RR: 18 (13 Sep 2019 14:01) (16 - 18)  SpO2: 100% (13 Sep 2019 14:01) (100% - 100%)    Gen: NAD  Breast: Soft, nontender, not engorged.  Abdomen: Soft, nontender, no distension , firm uterine fundus at umbilicus.  Incision: Clean, dry, and intact with steri strips  Pelvic: Normal lochia noted  Ext: No calf tenderness    LABS:                        9.4    10.64 )-----------( 193      ( 13 Sep 2019 06:43 )             30.4       Rubella status:     Allergies    No Known Allergies    Intolerances      MEDICATIONS  (STANDING):  acetaminophen   Tablet .. 975 milliGRAM(s) Oral <User Schedule>  diphtheria/tetanus/pertussis (acellular) Vaccine (ADAcel) 0.5 milliLiter(s) IntraMuscular once  heparin  Injectable 88267 Unit(s) SubCutaneous every 8 hours  ibuprofen  Tablet. 600 milliGRAM(s) Oral every 6 hours  influenza   Vaccine 0.5 milliLiter(s) IntraMuscular once  ketorolac   Injectable 30 milliGRAM(s) IV Push every 6 hours  lactated ringers. 1000 milliLiter(s) (125 mL/Hr) IV Continuous <Continuous>    MEDICATIONS  (PRN):  diphenhydrAMINE 25 milliGRAM(s) Oral every 6 hours PRN Itching  docusate sodium 100 milliGRAM(s) Oral two times a day PRN Stool softening  glycerin Suppository - Adult 1 Suppository(s) Rectal at bedtime PRN Constipation  lanolin Ointment 1 Application(s) Topical every 6 hours PRN Sore Nipples  magnesium hydroxide Suspension 30 milliLiter(s) Oral two times a day PRN Constipation  oxyCODONE    IR 5 milliGRAM(s) Oral every 3 hours PRN Moderate to Severe Pain (4-10)  oxyCODONE    IR 5 milliGRAM(s) Oral once PRN Moderate to Severe Pain (4-10)  simethicone 80 milliGRAM(s) Chew every 4 hours PRN Gas OB Attending on call: Full note     Postpartum Note, Repeat  Section  She is a  29y woman who is now post-operative day: 1    Subjective:  The patient feels well.  She is ambulating.   She is tolerating regular diet.  She denies nausea and vomiting.  She is voiding.  Her pain is controlled.  She reports normal postpartum bleeding.  She is breastfeeding.  She is formula feeding.    Physical exam:    Vital Signs Last 24 Hrs  T(C): 36.7 (13 Sep 2019 14:01), Max: 36.8 (13 Sep 2019 02:00)  T(F): 98.1 (13 Sep 2019 14:01), Max: 98.2 (13 Sep 2019 02:00)  HR: 76 (13 Sep 2019 14:01) (71 - 84)  BP: 105/57 (13 Sep 2019 14:01) (105/57 - 112/64)  BP(mean): --  RR: 18 (13 Sep 2019 14:01) (16 - 18)  SpO2: 100% (13 Sep 2019 14:01) (100% - 100%)    Gen: NAD  Breast: Soft, nontender, not engorged.  CVS: Positive S1S2, RRR  Lungs: CTA B/L, No W/R/R  Abdomen: Soft, nontender, no distension , firm uterine fundus at umbilicus.  Incision: Clean, dry, and intact with steri strips  Pelvic: Normal lochia noted  Ext: No calf tenderness    LABS:                        9.4    10.64 )-----------( 193      ( 13 Sep 2019 06:43 )             30.4       Rubella status:     Allergies    No Known Allergies    Intolerances      MEDICATIONS  (STANDING):  acetaminophen   Tablet .. 975 milliGRAM(s) Oral <User Schedule>  diphtheria/tetanus/pertussis (acellular) Vaccine (ADAcel) 0.5 milliLiter(s) IntraMuscular once  heparin  Injectable 43116 Unit(s) SubCutaneous every 8 hours  ibuprofen  Tablet. 600 milliGRAM(s) Oral every 6 hours  influenza   Vaccine 0.5 milliLiter(s) IntraMuscular once  ketorolac   Injectable 30 milliGRAM(s) IV Push every 6 hours  lactated ringers. 1000 milliLiter(s) (125 mL/Hr) IV Continuous <Continuous>    MEDICATIONS  (PRN):  diphenhydrAMINE 25 milliGRAM(s) Oral every 6 hours PRN Itching  docusate sodium 100 milliGRAM(s) Oral two times a day PRN Stool softening  glycerin Suppository - Adult 1 Suppository(s) Rectal at bedtime PRN Constipation  lanolin Ointment 1 Application(s) Topical every 6 hours PRN Sore Nipples  magnesium hydroxide Suspension 30 milliLiter(s) Oral two times a day PRN Constipation  oxyCODONE    IR 5 milliGRAM(s) Oral every 3 hours PRN Moderate to Severe Pain (4-10)  oxyCODONE    IR 5 milliGRAM(s) Oral once PRN Moderate to Severe Pain (4-10)  simethicone 80 milliGRAM(s) Chew every 4 hours PRN Gas

## 2019-09-13 NOTE — PROGRESS NOTE ADULT - ASSESSMENT
30yo POD#1 s/p LTCS.  Patient is stable and doing well post-operatively
A/P: POD#1 s/p RC/S, Mild Anemia    Doing well  Ambulate  Increased ISM use  Pain controlled  Mild Anemia, needs Iron BID on D/C home  Continue postop care

## 2019-09-13 NOTE — PROGRESS NOTE ADULT - PROBLEM SELECTOR PLAN 1
- Continue regular diet.  - Increase ambulation.  - Continue oral pain medication   - F/u AM CBC    Pranav Schaeffer PGY1

## 2019-09-13 NOTE — PROGRESS NOTE ADULT - SUBJECTIVE AND OBJECTIVE BOX
OB Progress Note:  Delivery, POD#1    S: 30yo POD#1 s/p LTCS . Her pain is well controlled. She is tolerating a regular diet and passing flatus. Denies N/V. Denies CP/SOB/lightheadedness/dizziness.   She is ambulating without difficulty.   Voiding spontaneously   Denies any heavy vaginal bleeding or passage of clots; bandage removed this morning.     O:   Vital Signs Last 24 Hrs  T(C): 36.8 (13 Sep 2019 02:00), Max: 36.8 (12 Sep 2019 09:03)  T(F): 98.2 (13 Sep 2019 02:00), Max: 98.24 (12 Sep 2019 09:05)  HR: 71 (13 Sep 2019 02:00) (55 - 98)  BP: 112/64 (13 Sep 2019 02:00) (99/81 - 134/72)  BP(mean): 75 (12 Sep 2019 16:30) (66 - 85)  RR: 18 (13 Sep 2019 02:00) (12 - 20)  SpO2: 100% (13 Sep 2019 02:00) (98% - 100%)    Labs:  Blood type: B Positive  Rubella IgG: RPR: Negative                          10.2<L>   5.66 >-----------< 212    (  @ 09:00 )             32.6<L>          PE:  General: NAD  Abdomen: Mildly distended, appropriately tender, Pfannenstiel incision c/d/i.    Extremities: No erythema, no pitting edema

## 2019-09-13 NOTE — LACTATION INITIAL EVALUATION - NS LACT CON REASON FOR REQ
general questions without assessment/multiparous mom/reviewed  risks  of  formula  feeding  . discussed  signs  of  effective  feeding and  swallowing.  instructed  to offer both  breast at a feeding ,feed on cue and safe  skin to skin.  offered assistance as needed.

## 2019-09-14 ENCOUNTER — TRANSCRIPTION ENCOUNTER (OUTPATIENT)
Age: 29
End: 2019-09-14

## 2019-09-14 RX ORDER — IBUPROFEN 200 MG
1 TABLET ORAL
Qty: 0 | Refills: 0 | DISCHARGE
Start: 2019-09-14

## 2019-09-14 RX ORDER — ACETAMINOPHEN 500 MG
3 TABLET ORAL
Qty: 0 | Refills: 0 | DISCHARGE
Start: 2019-09-14

## 2019-09-14 RX ADMIN — INFLUENZA VIRUS VACCINE 0.5 MILLILITER(S): 15; 15; 15; 15 SUSPENSION INTRAMUSCULAR at 19:35

## 2019-09-14 RX ADMIN — Medication 975 MILLIGRAM(S): at 11:53

## 2019-09-14 RX ADMIN — Medication 975 MILLIGRAM(S): at 11:23

## 2019-09-14 RX ADMIN — Medication 600 MILLIGRAM(S): at 14:12

## 2019-09-14 RX ADMIN — Medication 975 MILLIGRAM(S): at 18:12

## 2019-09-14 RX ADMIN — Medication 600 MILLIGRAM(S): at 19:43

## 2019-09-14 RX ADMIN — Medication 600 MILLIGRAM(S): at 06:08

## 2019-09-14 RX ADMIN — Medication 600 MILLIGRAM(S): at 06:07

## 2019-09-14 RX ADMIN — Medication 600 MILLIGRAM(S): at 13:42

## 2019-09-14 RX ADMIN — HEPARIN SODIUM 10000 UNIT(S): 5000 INJECTION INTRAVENOUS; SUBCUTANEOUS at 19:36

## 2019-09-14 RX ADMIN — Medication 975 MILLIGRAM(S): at 17:48

## 2019-09-14 RX ADMIN — HEPARIN SODIUM 10000 UNIT(S): 5000 INJECTION INTRAVENOUS; SUBCUTANEOUS at 11:15

## 2019-09-14 RX ADMIN — Medication 600 MILLIGRAM(S): at 19:36

## 2019-09-14 NOTE — DISCHARGE NOTE OB - MATERIALS PROVIDED
Breastfeeding Log/Shaken Baby Prevention Handout/Birth Certificate Instructions/[facility]  Screening Program/[place]  Immunization Record/Tdap Vaccination (VIS Pub Date: 2012)/Back To Sleep Handout/Breastfeeding Guide and Packet/Breastfeeding Mother’s Support Group Information/Guide to Postpartum Care/[facility] Hearing Screen Program/Vaccinations

## 2019-09-14 NOTE — DISCHARGE NOTE OB - MEDICATION SUMMARY - MEDICATIONS TO TAKE
I will START or STAY ON the medications listed below when I get home from the hospital:    acetaminophen 325 mg oral tablet  -- 3 tab(s) by mouth   -- Indication: For  delivery delivered    ibuprofen 600 mg oral tablet  -- 1 tab(s) by mouth every 6 hours  -- Indication: For  delivery delivered    Prenatal Multivitamins oral tablet (obsolete)  -- orally once a day  -- Indication: For  delivery delivered    ferrous sulfate 325 mg (65 mg elemental iron) oral tablet  -- orally once a day  -- Indication: For  delivery delivered    docusate sodium 100 mg oral tablet  -- orally once a day  -- Indication: For  delivery delivered

## 2019-09-14 NOTE — DISCHARGE NOTE OB - CARE PLAN
Principal Discharge DX:	 delivery delivered  Goal:	post op/post partum recovery  Assessment and plan of treatment:	discharge home after repeat  POD#3 stable  f/up 2 weeks in clinic

## 2019-09-14 NOTE — PROGRESS NOTE ADULT - SUBJECTIVE AND OBJECTIVE BOX
SUBJECTIVE:    Pain: Controlled    Complaints: None    MILESTONES:    Alert and Oriented x 3  [ x ]  Out of bed/ ambulating. [ x ]  Flatus:   Positive [ x ]  Negative [  ]  Bowel movement  [  ] Positive [  ] Negative   Voiding [x  ] Due to void [  ]   Briones/Indwelling catheter in place [  ]  Diet: Regular [ x ]  Clears [  ]  NPO [  ]    Infant feeding:  Breast [  ]   Bottle [  ]  Both [ X ]  Feeding related issues and/or concerns:      OBJECTIVE:  T(C): 36.7 (19 @ 05:51), Max: 36.8 (19 @ 21:29)  HR: 70 (19 @ 05:51) (69 - 76)  BP: 113/58 (19 @ 05:51) (103/66 - 113/58)  RR: 18 (19 @ 05:51) (18 - 18)  SpO2: 100% (19 @ 05:51) (100% - 100%)  Wt(kg): --                        9.4    10.64 )-----------( 193      ( 13 Sep 2019 06:43 )             30.4           Blood Type: B Positive    RPR: Negative          MEDICATIONS  (STANDING):  acetaminophen   Tablet .. 975 milliGRAM(s) Oral <User Schedule>  diphtheria/tetanus/pertussis (acellular) Vaccine (ADAcel) 0.5 milliLiter(s) IntraMuscular once  heparin  Injectable 27587 Unit(s) SubCutaneous every 8 hours  ibuprofen  Tablet. 600 milliGRAM(s) Oral every 6 hours  influenza   Vaccine 0.5 milliLiter(s) IntraMuscular once    MEDICATIONS  (PRN):  artificial  tears Solution 2 Drop(s) Both EYES every 4 hours PRN Dry Eyes  diphenhydrAMINE 25 milliGRAM(s) Oral every 6 hours PRN Itching  docusate sodium 100 milliGRAM(s) Oral two times a day PRN Stool softening  glycerin Suppository - Adult 1 Suppository(s) Rectal at bedtime PRN Constipation  lanolin Ointment 1 Application(s) Topical every 6 hours PRN Sore Nipples  magnesium hydroxide Suspension 30 milliLiter(s) Oral two times a day PRN Constipation  oxyCODONE    IR 5 milliGRAM(s) Oral every 3 hours PRN Moderate to Severe Pain (4-10)  oxyCODONE    IR 5 milliGRAM(s) Oral once PRN Moderate to Severe Pain (4-10)  simethicone 80 milliGRAM(s) Chew every 4 hours PRN Gas        ASSESSMENT:    29y     G  2    P         PO Day#  2        Delivery: Primary [  ]    Repeat [ X ]                                         Indication of procedure: Scheduled    Condition: Stable    Past Medical History significant for: HPI:  30yo female  EDC   presents  39.2wks for scheduled  rltcs.  +AP course transverse lie, otherwise unremarkable.   Denies VB,ROM or UCs today.  +FM (12 Sep 2019 09:54)      Current Issues:    Breasts:  Soft [x  ]   Engorged [  ]  Nipples:  Abdomen: Soft [ x ]   Distended [  ] Nontender [  ]     Bowel sounds :  Present [  ]  Absent [  ]   Fundus:  Firm [x  ]  Boggy [  ]    Abdominal incision: Clean, Dry and Intact [x  ]  Staples [  ] Steri Strips [X  ] Dermabond [  ] Sutures [  ]    Patient wearing abdominal binder for support.    Vaginal: Lochia:  Heavy [  ]  Moderate [ x ]   Scant [  ]    Extremities: Edema [ X ] Negative Melany's Sign [ X ] Nontender Siddharth  [ x ] Positive pedal pulses [  ]    Other relevant physical exam findings:      PLAN:    Plan: Increase ambulation, analgesia PRN and pain medication protocol standing oxycodone, ibuprofen and acetaminophen.    Diet: Regular diet    Continue routine post-operative and postpartum care.  Artificial Tears as needed.    Discharge Planning [ x ]    For discharge Today  [    ]    Consults:  Social Work [  ]  Lactation [ x ]  Other [         ]

## 2019-09-14 NOTE — DISCHARGE NOTE OB - CARE PROVIDER_API CALL
Keisha Kaur)  Obstetrics and Gynecology  [address] [place] [address]  Phone: [phone]  Fax: [phone]  Follow Up Time:

## 2019-09-14 NOTE — PROGRESS NOTE ADULT - SUBJECTIVE AND OBJECTIVE BOX
30 y/o P2 s/p repeat  POD#2    pt examined at bedside  ambulating, voiding  tolerating regular diet  pos flatus  incisional pain controlled    vitals  /58 P70 T98.1 RR18  cardio ns1s2  lungs ctab  abdomen appropriate distention  pfann incision clean/dry/intact- subcut closure  uterus firm/ non tender at umbilicus    POD#1 CBC  wbc 10, h/h 9.4/20, ptl 193    plan  cont post partum/post Op care  cont PO pain mngt  cont regular diet  cont DVT prophylaxis  encouraged to ambulated  anticipate d/c home tomorrow POD#3 30 y/o P2 s/p repeat  POD#2    pt examined at bedside  ambulating, voiding  tolerating regular diet  pos flatus  incisional pain controlled    vitals  /58 P70 T98.1 RR18  cardio ns1s2  lungs ctab  abdomen appropriate distention  pfann incision clean/dry/intact- subcut closure  uterus firm/ non tender at umbilicus    POD#1 CBC  wbc 10, h/h 9.4/30, ptl 193    plan  cont post partum/post Op care  cont PO pain mngt  cont regular diet  cont DVT prophylaxis  encouraged to ambulated  anticipate d/c home tomorrow POD#3

## 2019-09-15 VITALS
SYSTOLIC BLOOD PRESSURE: 121 MMHG | HEART RATE: 77 BPM | TEMPERATURE: 99 F | DIASTOLIC BLOOD PRESSURE: 69 MMHG | RESPIRATION RATE: 17 BRPM | OXYGEN SATURATION: 100 %

## 2019-09-15 RX ORDER — TETANUS TOXOID, REDUCED DIPHTHERIA TOXOID AND ACELLULAR PERTUSSIS VACCINE, ADSORBED 5; 2.5; 8; 8; 2.5 [IU]/.5ML; [IU]/.5ML; UG/.5ML; UG/.5ML; UG/.5ML
0.5 SUSPENSION INTRAMUSCULAR ONCE
Refills: 0 | Status: COMPLETED | OUTPATIENT
Start: 2019-09-15 | End: 2019-09-15

## 2019-09-15 RX ADMIN — Medication 975 MILLIGRAM(S): at 09:44

## 2019-09-15 RX ADMIN — Medication 600 MILLIGRAM(S): at 05:57

## 2019-09-15 RX ADMIN — Medication 975 MILLIGRAM(S): at 10:44

## 2019-09-15 RX ADMIN — HEPARIN SODIUM 10000 UNIT(S): 5000 INJECTION INTRAVENOUS; SUBCUTANEOUS at 05:57

## 2019-09-15 RX ADMIN — TETANUS TOXOID, REDUCED DIPHTHERIA TOXOID AND ACELLULAR PERTUSSIS VACCINE, ADSORBED 0.5 MILLILITER(S): 5; 2.5; 8; 8; 2.5 SUSPENSION INTRAMUSCULAR at 09:46

## 2019-09-15 NOTE — PROGRESS NOTE ADULT - SUBJECTIVE AND OBJECTIVE BOX
SUBJECTIVE:    Pain: Controlled    Complaints: None    MILESTONES:    Alert and Oriented x 3  [ x ]  Out of bed/ ambulating. [ x ]  Flatus:   Positive [ x ]  Negative [  ]  Bowel movement  [  ] Positive [  ] Negative   Voiding [x  ] Due to void [  ]   Briones/Indwelling catheter in place [  ]  Diet: Regular [ x ]  Clears [  ]  NPO [  ]    Infant feeding:  Breast [  ]   Bottle [  ]  Both [  ]  Feeding related issues and/or concerns:      OBJECTIVE:  T(C): 37.2 (09-15-19 @ 05:17), Max: 37.2 (19 @ 13:41)  HR: 77 (09-15-19 @ 05:17) (72 - 89)  BP: 121/69 (09-15-19 @ 05:17) (108/57 - 121/69)  RR: 17 (09-15-19 @ 05:17) (16 - 18)  SpO2: 100% (09-15-19 @ 05:17) (100% - 100%)  Wt(kg): --          Blood Type: B Positive    RPR: Negative          MEDICATIONS  (STANDING):  acetaminophen   Tablet .. 975 milliGRAM(s) Oral <User Schedule>  diphtheria/tetanus/pertussis (acellular) Vaccine (ADAcel) 0.5 milliLiter(s) IntraMuscular once  heparin  Injectable 77387 Unit(s) SubCutaneous every 8 hours  ibuprofen  Tablet. 600 milliGRAM(s) Oral every 6 hours    MEDICATIONS  (PRN):  artificial  tears Solution 2 Drop(s) Both EYES every 4 hours PRN Dry Eyes  diphenhydrAMINE 25 milliGRAM(s) Oral every 6 hours PRN Itching  docusate sodium 100 milliGRAM(s) Oral two times a day PRN Stool softening  glycerin Suppository - Adult 1 Suppository(s) Rectal at bedtime PRN Constipation  lanolin Ointment 1 Application(s) Topical every 6 hours PRN Sore Nipples  magnesium hydroxide Suspension 30 milliLiter(s) Oral two times a day PRN Constipation  oxyCODONE    IR 5 milliGRAM(s) Oral every 3 hours PRN Moderate to Severe Pain (4-10)  oxyCODONE    IR 5 milliGRAM(s) Oral once PRN Moderate to Severe Pain (4-10)  simethicone 80 milliGRAM(s) Chew every 4 hours PRN Gas        ASSESSMENT:    29y     G  2    P         PO Day# 3         Delivery: Primary [  ]    Repeat [ X ]       EBL - 900                                  Indication of procedure: Scheduled    Condition: Stable    Past Medical History significant for: HPI:  28yo female  EDC   presents  39.2wks for scheduled  rltcs.  +AP course transverse lie, otherwise unremarkable.   Denies VB,ROM or UCs today.  +FM (12 Sep 2019 09:54)      Current Issues:    Breasts:  Soft [x  ]   Engorged [  ]  Nipples:  Abdomen: Soft [ x ]   Distended [  ] Nontender [  ]     Bowel sounds :  Present [  ]  Absent [  ]   Fundus:  Firm [x  ]  Boggy [  ]    Abdominal incision: Clean, Dry and Intact [x  ]  Staples [  ] Steri Strips [ X ] Dermabond [  ] Sutures [  ]    Patient wearing abdominal binder for support.    Vaginal: Lochia:  Heavy [  ]  Moderate [ x ]   Scant [  ]    Extremities: Edema [  ] Negative Melany's Sign [  ] Nontender Siddharth  [ x ] Positive pedal pulses [  ]    Other relevant physical exam findings:      PLAN:    Plan: Increase ambulation, analgesia PRN and pain medication protocol standing oxycodone, ibuprofen and acetaminophen.    Diet: Regular diet    Continue routine post-operative and postpartum care.     Discharge Planning [ x ]    For discharge Today  [  X  ]    Consults:  Social Work [  ]  Lactation [ x ]  Other [         ]

## 2021-10-22 NOTE — OB RN DELIVERY SUMMARY - NS_CORDVESSELS_OBGYN_ALL_OB
3
I have reviewed and confirmed nurses' notes for patient's medications, allergies, medical history, and surgical history.

## 2022-03-01 NOTE — DISCHARGE NOTE OB - PATIENT PORTAL LINK FT
Small, frequent meals (up to 5x/day)   You can access the FollowMyHealth Patient Portal offered by [facility] by registering at the following website: [email or web address]. By joining [facility]’s FollowMyHealth portal, you will also be able to view your health information using other applications (apps) compatible with our system.

## 2022-04-18 NOTE — OB RN PATIENT PROFILE - EDUCATION ON THE POTENTIAL RISKS AND IMPACT OF EARLY USE OF PACIFIERS ON THE ESTABLISHMENT OF BREASTFEEDING
[FreeTextEntry1] : 28y/o female with \par \par 1-   shortness of breath    ( last episode  10 years ago)\par            ddx  asthma  vs   tachycardia  vs  ozempic  vs  VTE vs      laryngospasm \par 2-  h/o   tachycardia  last work up  cardiology  2018\par 3-  weight loss with   ozempic\par 4-  gerd\par 5-  has two cats \par \par \par Recommendations\par 1-  ctpa ( ordered will get copy from her primary )\par 2-  PFT covid \par 3-  methacholine challenge test vs  cardiopulmonary stress test ( tachycardia response to exercise)\par 4-  cardiology follow up \par 5-  trial to stop   ozempic\par 6- PPI\par 7-  asthma profile    d dimer  IgE cats , old records blood work \par 8-   d/c symbicort\par 9-   flovent 220 bid\par 10-  xopenex  two inh  q 4 hour prn  sob \par \par patient agrees to above \par return in  four weeks 
Statement Selected

## 2022-12-14 ENCOUNTER — APPOINTMENT (OUTPATIENT)
Dept: OPHTHALMOLOGY | Facility: CLINIC | Age: 32
End: 2022-12-14

## 2023-02-17 ENCOUNTER — APPOINTMENT (OUTPATIENT)
Dept: ANTEPARTUM | Facility: CLINIC | Age: 33
End: 2023-02-17
Payer: MEDICAID

## 2023-02-17 ENCOUNTER — ASOB RESULT (OUTPATIENT)
Age: 33
End: 2023-02-17

## 2023-02-17 PROCEDURE — 99203 OFFICE O/P NEW LOW 30 MIN: CPT | Mod: 25

## 2023-02-17 PROCEDURE — 76811 OB US DETAILED SNGL FETUS: CPT

## 2023-04-21 ENCOUNTER — ASOB RESULT (OUTPATIENT)
Age: 33
End: 2023-04-21

## 2023-04-21 ENCOUNTER — APPOINTMENT (OUTPATIENT)
Dept: ANTEPARTUM | Facility: CLINIC | Age: 33
End: 2023-04-21
Payer: MEDICAID

## 2023-04-21 PROCEDURE — 76816 OB US FOLLOW-UP PER FETUS: CPT

## 2023-04-21 PROCEDURE — 76819 FETAL BIOPHYS PROFIL W/O NST: CPT | Mod: 59

## 2023-05-19 ENCOUNTER — APPOINTMENT (OUTPATIENT)
Dept: ANTEPARTUM | Facility: CLINIC | Age: 33
End: 2023-05-19
Payer: MEDICAID

## 2023-05-19 ENCOUNTER — ASOB RESULT (OUTPATIENT)
Age: 33
End: 2023-05-19

## 2023-05-19 PROCEDURE — 76819 FETAL BIOPHYS PROFIL W/O NST: CPT

## 2023-05-19 PROCEDURE — 76816 OB US FOLLOW-UP PER FETUS: CPT

## 2023-06-05 ENCOUNTER — NON-APPOINTMENT (OUTPATIENT)
Age: 33
End: 2023-06-05

## 2023-06-05 ENCOUNTER — APPOINTMENT (OUTPATIENT)
Dept: MATERNAL FETAL MEDICINE | Facility: CLINIC | Age: 33
End: 2023-06-05

## 2023-06-12 ENCOUNTER — APPOINTMENT (OUTPATIENT)
Dept: MATERNAL FETAL MEDICINE | Facility: CLINIC | Age: 33
End: 2023-06-12

## 2023-06-13 ENCOUNTER — APPOINTMENT (OUTPATIENT)
Dept: ANTEPARTUM | Facility: CLINIC | Age: 33
End: 2023-06-13
Payer: MEDICAID

## 2023-06-13 ENCOUNTER — ASOB RESULT (OUTPATIENT)
Age: 33
End: 2023-06-13

## 2023-06-13 ENCOUNTER — APPOINTMENT (OUTPATIENT)
Dept: ANTEPARTUM | Facility: CLINIC | Age: 33
End: 2023-06-13

## 2023-06-13 ENCOUNTER — NON-APPOINTMENT (OUTPATIENT)
Age: 33
End: 2023-06-13

## 2023-06-13 PROCEDURE — 76818 FETAL BIOPHYS PROFILE W/NST: CPT

## 2023-06-13 PROCEDURE — 76816 OB US FOLLOW-UP PER FETUS: CPT

## 2023-06-13 PROCEDURE — 99212 OFFICE O/P EST SF 10 MIN: CPT | Mod: 25

## 2023-06-14 ENCOUNTER — APPOINTMENT (OUTPATIENT)
Dept: MATERNAL FETAL MEDICINE | Facility: CLINIC | Age: 33
End: 2023-06-14
Payer: MEDICAID

## 2023-06-14 ENCOUNTER — ASOB RESULT (OUTPATIENT)
Age: 33
End: 2023-06-14

## 2023-06-14 PROCEDURE — G0109 DIAB MANAGE TRN IND/GROUP: CPT | Mod: 95

## 2023-06-14 RX ORDER — URINE ACETONE TEST STRIPS
STRIP MISCELLANEOUS
Qty: 1 | Refills: 2 | Status: ACTIVE | COMMUNITY
Start: 2023-06-14 | End: 1900-01-01

## 2023-06-16 ENCOUNTER — APPOINTMENT (OUTPATIENT)
Dept: ANTEPARTUM | Facility: CLINIC | Age: 33
End: 2023-06-16
Payer: MEDICAID

## 2023-06-16 ENCOUNTER — ASOB RESULT (OUTPATIENT)
Age: 33
End: 2023-06-16

## 2023-06-16 PROCEDURE — 76818 FETAL BIOPHYS PROFILE W/NST: CPT

## 2023-06-20 ENCOUNTER — APPOINTMENT (OUTPATIENT)
Dept: ANTEPARTUM | Facility: CLINIC | Age: 33
End: 2023-06-20
Payer: MEDICAID

## 2023-06-20 ENCOUNTER — ASOB RESULT (OUTPATIENT)
Age: 33
End: 2023-06-20

## 2023-06-20 PROCEDURE — 76818 FETAL BIOPHYS PROFILE W/NST: CPT

## 2023-06-23 ENCOUNTER — ASOB RESULT (OUTPATIENT)
Age: 33
End: 2023-06-23

## 2023-06-23 ENCOUNTER — APPOINTMENT (OUTPATIENT)
Dept: MATERNAL FETAL MEDICINE | Facility: CLINIC | Age: 33
End: 2023-06-23
Payer: MEDICAID

## 2023-06-23 ENCOUNTER — APPOINTMENT (OUTPATIENT)
Dept: ANTEPARTUM | Facility: CLINIC | Age: 33
End: 2023-06-23
Payer: MEDICAID

## 2023-06-23 PROCEDURE — 76818 FETAL BIOPHYS PROFILE W/NST: CPT

## 2023-06-23 PROCEDURE — G0108 DIAB MANAGE TRN  PER INDIV: CPT | Mod: 95

## 2023-06-27 ENCOUNTER — APPOINTMENT (OUTPATIENT)
Dept: ANTEPARTUM | Facility: CLINIC | Age: 33
End: 2023-06-27
Payer: MEDICAID

## 2023-06-27 ENCOUNTER — ASOB RESULT (OUTPATIENT)
Age: 33
End: 2023-06-27

## 2023-06-27 PROCEDURE — 76818 FETAL BIOPHYS PROFILE W/NST: CPT

## 2023-06-30 ENCOUNTER — APPOINTMENT (OUTPATIENT)
Dept: ANTEPARTUM | Facility: CLINIC | Age: 33
End: 2023-06-30
Payer: MEDICAID

## 2023-06-30 ENCOUNTER — OUTPATIENT (OUTPATIENT)
Dept: OUTPATIENT SERVICES | Facility: HOSPITAL | Age: 33
LOS: 1 days | End: 2023-06-30

## 2023-06-30 ENCOUNTER — ASOB RESULT (OUTPATIENT)
Age: 33
End: 2023-06-30

## 2023-06-30 VITALS
RESPIRATION RATE: 18 BRPM | HEART RATE: 86 BPM | WEIGHT: 253.97 LBS | TEMPERATURE: 98 F | HEIGHT: 64 IN | SYSTOLIC BLOOD PRESSURE: 116 MMHG | DIASTOLIC BLOOD PRESSURE: 76 MMHG | OXYGEN SATURATION: 99 %

## 2023-06-30 DIAGNOSIS — Z98.891 HISTORY OF UTERINE SCAR FROM PREVIOUS SURGERY: ICD-10-CM

## 2023-06-30 DIAGNOSIS — Z91.89 OTHER SPECIFIED PERSONAL RISK FACTORS, NOT ELSEWHERE CLASSIFIED: ICD-10-CM

## 2023-06-30 DIAGNOSIS — Z98.891 HISTORY OF UTERINE SCAR FROM PREVIOUS SURGERY: Chronic | ICD-10-CM

## 2023-06-30 LAB
ANION GAP SERPL CALC-SCNC: 13 MMOL/L — SIGNIFICANT CHANGE UP (ref 7–14)
APPEARANCE UR: ABNORMAL
BILIRUB UR-MCNC: NEGATIVE — SIGNIFICANT CHANGE UP
BLD GP AB SCN SERPL QL: NEGATIVE — SIGNIFICANT CHANGE UP
BUN SERPL-MCNC: 5 MG/DL — LOW (ref 7–23)
CALCIUM SERPL-MCNC: 9.7 MG/DL — SIGNIFICANT CHANGE UP (ref 8.4–10.5)
CHLORIDE SERPL-SCNC: 103 MMOL/L — SIGNIFICANT CHANGE UP (ref 98–107)
CO2 SERPL-SCNC: 21 MMOL/L — LOW (ref 22–31)
COLOR SPEC: YELLOW — SIGNIFICANT CHANGE UP
CREAT SERPL-MCNC: 0.56 MG/DL — SIGNIFICANT CHANGE UP (ref 0.5–1.3)
DIFF PNL FLD: NEGATIVE — SIGNIFICANT CHANGE UP
EGFR: 124 ML/MIN/1.73M2 — SIGNIFICANT CHANGE UP
GLUCOSE SERPL-MCNC: 66 MG/DL — LOW (ref 70–99)
GLUCOSE UR QL: NEGATIVE — SIGNIFICANT CHANGE UP
HCT VFR BLD CALC: 33.3 % — LOW (ref 34.5–45)
HGB BLD-MCNC: 10.5 G/DL — LOW (ref 11.5–15.5)
KETONES UR-MCNC: ABNORMAL
LEUKOCYTE ESTERASE UR-ACNC: ABNORMAL
MCHC RBC-ENTMCNC: 27.5 PG — SIGNIFICANT CHANGE UP (ref 27–34)
MCHC RBC-ENTMCNC: 31.5 GM/DL — LOW (ref 32–36)
MCV RBC AUTO: 87.2 FL — SIGNIFICANT CHANGE UP (ref 80–100)
NITRITE UR-MCNC: NEGATIVE — SIGNIFICANT CHANGE UP
NRBC # BLD: 0 /100 WBCS — SIGNIFICANT CHANGE UP (ref 0–0)
NRBC # FLD: 0 K/UL — SIGNIFICANT CHANGE UP (ref 0–0)
PH UR: 6.5 — SIGNIFICANT CHANGE UP (ref 5–8)
PLATELET # BLD AUTO: 223 K/UL — SIGNIFICANT CHANGE UP (ref 150–400)
POTASSIUM SERPL-MCNC: 4.1 MMOL/L — SIGNIFICANT CHANGE UP (ref 3.5–5.3)
POTASSIUM SERPL-SCNC: 4.1 MMOL/L — SIGNIFICANT CHANGE UP (ref 3.5–5.3)
PROT UR-MCNC: ABNORMAL
RBC # BLD: 3.82 M/UL — SIGNIFICANT CHANGE UP (ref 3.8–5.2)
RBC # FLD: 16.7 % — HIGH (ref 10.3–14.5)
RH IG SCN BLD-IMP: POSITIVE — SIGNIFICANT CHANGE UP
SODIUM SERPL-SCNC: 137 MMOL/L — SIGNIFICANT CHANGE UP (ref 135–145)
SP GR SPEC: 1.02 — SIGNIFICANT CHANGE UP (ref 1.01–1.05)
UROBILINOGEN FLD QL: SIGNIFICANT CHANGE UP
WBC # BLD: 7.09 K/UL — SIGNIFICANT CHANGE UP (ref 3.8–10.5)
WBC # FLD AUTO: 7.09 K/UL — SIGNIFICANT CHANGE UP (ref 3.8–10.5)

## 2023-06-30 PROCEDURE — 76818 FETAL BIOPHYS PROFILE W/NST: CPT

## 2023-06-30 RX ORDER — DOCUSATE SODIUM 100 MG
0 CAPSULE ORAL
Qty: 0 | Refills: 0 | DISCHARGE

## 2023-06-30 NOTE — OB PST NOTE - PROBLEM SELECTOR PLAN 1
Patient tentatively scheduled for repeat Caesarean section, bilateral salpingectomy on 7/3/23.  Pre-op instructions provided. Pt given verbal and written instructions with teach back on chlorhexidine shampoo. Pt verbalized understanding with return demonstration.     CBC, BMP, T&S and Ua stat were done at PST.  Patient instructed to follow up with OBgyn for further medication instructions. Patient tentatively scheduled for repeat Caesarean section, bilateral salpingectomy on 7/3/23.  Pre-op instructions provided. Pt given verbal and written instructions with teach back on chlorhexidine shampoo. Pt verbalized understanding with return demonstration.     CBC, BMP, T&S and Ua stat were done at PST.  Patient instructed to follow up with Obgyn for further medication instructions.

## 2023-06-30 NOTE — OB PST NOTE - HISTORY OF PRESENT ILLNESS
32 year old pregnant female with pmhx of Gestational diabetes presents to presurgical testing for diagnosis of History of uterine scar from previous surgery. Pt is scheduled for repeat Caesarean section, bilateral salpingectomy. Patient denies vaginal  bleeding, spotting or leakage of amniotic fluid. Patient denies regular contractions. Patient reports positive fetal movement.  32 year old pregnant female with pmhx of Morbid obesity, Gestational diabetes presents to presurgical testing for diagnosis of History of uterine scar from previous surgery. Pt is scheduled for repeat Caesarean section, bilateral salpingectomy. Patient denies vaginal  bleeding, spotting or leakage of amniotic fluid. Patient denies regular contractions. Patient reports positive fetal movement.

## 2023-06-30 NOTE — OB PST NOTE - ENERGY EXPENDITURE (METS)
METS 4- Able to climb up 2 flights of stairs, do heavy house chores and walk up hill without symptoms

## 2023-06-30 NOTE — OB PST NOTE - NSICDXPASTMEDICALHX_GEN_ALL_CORE_FT
PAST MEDICAL HISTORY:  Gestational diabetes     History of uterine scar from previous surgery     Iron deficiency anemia      PAST MEDICAL HISTORY:  Gestational diabetes     History of uterine scar from previous surgery     Iron deficiency anemia     Morbid obesity

## 2023-06-30 NOTE — OB PST NOTE - NSICDXFAMILYHX_GEN_ALL_CORE_FT
FAMILY HISTORY:  Mother  Still living? Unknown  Family history of renal disease, Age at diagnosis: Age Unknown

## 2023-06-30 NOTE — OB PST NOTE - NSHPPHYSICALEXAM_GEN_ALL_CORE
Constitutional: well developed, well groomed, well nourished, no distress    Eyes: PERRL, EOMI, conjunctiva clear    Ears: normal    Mouth and Gums: normal, moist    Pharynx: no tenderness, discharge, or peritonsillar abscess    Tonsils: no redness, discharge, tenderness, or swelling    Neck: supple, no JVD, normal thyroid gland, no cervical or paraspinal tenderness    Breast: normal shape    Back: normal shape, ROM intact, strength intact, no vertebral tenderness    Respiratory: airway patent, breast sounds equal, good air movement, respiration non-labored, clear to auscultation bilaterally, no chest wall tenderness, no wheezes, rhonchi, or rales    Cardiovascular: regular rate and rhythm, no rubs, murmur, normal PMI, non-pitting pedal edema    Gastrointestinal: gravid abdomen, non tender, normal bowel sounds    Extremities: no clubbing, cyanosis, or pedal edema    Vascular: carotid pulse normal, radial pulse normal, DP pulse normal, PT pulse normal    Neurological: alert and oriented x3, sensation intact, cranial nerves intact, normal strength    Skin: warm and dry, normal color    Lymph nodes: no anterior cervical lymphadenopathy    Musculoskeletal: ROM intact, normal strength, no joint swelling, warmth, or calf tenderness    Psychiatric: normal affect, normal behavior Constitutional: well developed, well groomed, well nourished, no distress, obese    Eyes: PERRL, EOMI, conjunctiva clear    Ears: normal    Mouth and Gums: normal, moist    Pharynx: no tenderness, discharge, or peritonsillar abscess    Tonsils: no redness, discharge, tenderness, or swelling    Neck: supple, no JVD, normal thyroid gland, no cervical or paraspinal tenderness    Breast: normal shape    Back: normal shape, ROM intact, strength intact, no vertebral tenderness    Respiratory: airway patent, breast sounds equal, good air movement, respiration non-labored, clear to auscultation bilaterally, no chest wall tenderness, no wheezes, rhonchi, or rales    Cardiovascular: regular rate and rhythm, no rubs, murmur, normal PMI, non-pitting pedal edema    Gastrointestinal: gravid abdomen, non tender, normal bowel sounds    Extremities: no clubbing, cyanosis, or pedal edema    Vascular: carotid pulse normal, radial pulse normal, DP pulse normal, PT pulse normal    Neurological: alert and oriented x3, sensation intact, cranial nerves intact, normal strength    Skin: warm and dry, normal color    Lymph nodes: no anterior cervical lymphadenopathy    Musculoskeletal: ROM intact, normal strength, no joint swelling, warmth, or calf tenderness    Psychiatric: normal affect, normal behavior

## 2023-06-30 NOTE — OB PST NOTE - NSHPREVIEWOFSYSTEMS_GEN_ALL_CORE
General: no fever, chills, sweating, anorexia, or weight loss    Skin: no rashes, itching, or dryness    Breast: no tenderness, lumps, or nipple discharge    Opthalmologic: no diplopia, photophobia, or blurred vision    ENMT: no hearing difficulty, ear pain, tinnitus, or vertigo. No sinus symptoms, nasal congestion, nasal discharge, or nasal obstruction    Respiratory and Thorax: no wheezing, dyspnea, or cough    Cardiovascular: no chest pain, palpitations, dyspnea on exertion, orthopnea, or peripheral edema    Gastrointestinal: no nausea, vomiting, diarrhea, constipation, change in bowel movements, or abdominal pain    Genitourinary and Pelvis: no hematuria, renal colic, flank pain, dysuria, nocturia. No abnormal vaginal bleeding, vaginal discharge, spotting, pelvic pain, or vaginal leakage    Musculoskeletal: no arthralgia, arthritis, joint swelling, muscle cramping, muscle weakness, neck pain, arm pain, or leg pain    Neurological: no transient paralysis, weakness, paresthesias, or seizures. No syncope, tremors, vertigo, loss of sensation, difficulty walking, loss of consciousness    Psychiatric: no suicidal ideation, depression, anxiety    Hematology: no nose bleeding, easy bruising or bleeding, or skin lumps    Lymphatic: no enlarged or tender lymph nodes, or extremity swelling    Endocrine: no heat or cold intolerance    Immunologic: no recurrent or persistent infections General: no fever, chills, sweating, anorexia, or weight loss    Skin: no rashes, itching, or dryness    Breast: no tenderness, lumps, or nipple discharge    Opthalmologic: no diplopia, photophobia, or blurred vision    ENMT: no hearing difficulty, ear pain, tinnitus, or vertigo. No sinus symptoms, nasal congestion, nasal discharge, or nasal obstruction    Respiratory and Thorax: no wheezing, dyspnea, or cough    Cardiovascular: no chest pain, palpitations, dyspnea on exertion, orthopnea, or peripheral edema    Gastrointestinal: no nausea, vomiting, diarrhea, constipation, change in bowel movements, or abdominal pain    Genitourinary and Pelvis: no hematuria, renal colic, flank pain, dysuria, nocturia. No abnormal vaginal bleeding, vaginal discharge, spotting, pelvic pain, or vaginal leakage    Musculoskeletal: no arthralgia, arthritis, joint swelling, muscle cramping, muscle weakness, neck pain, arm pain, or leg pain    Neurological: no transient paralysis, weakness, paresthesias, or seizures. No syncope, tremors, vertigo, loss of sensation, difficulty walking, loss of consciousness    Psychiatric: no suicidal ideation, depression, anxiety    Hematology: no nose bleeding, easy bruising or bleeding, or skin lumps    Lymphatic: no enlarged or tender lymph nodes, or extremity swelling    Endocrine: no heat or cold intolerance  Hx of Gestational DM- FS 80's-90s Q AM   Immunologic: no recurrent or persistent infections

## 2023-07-02 ENCOUNTER — TRANSCRIPTION ENCOUNTER (OUTPATIENT)
Age: 33
End: 2023-07-02

## 2023-07-03 ENCOUNTER — INPATIENT (INPATIENT)
Facility: HOSPITAL | Age: 33
LOS: 1 days | Discharge: HOME | End: 2023-07-05
Attending: OBSTETRICS & GYNECOLOGY | Admitting: OBSTETRICS & GYNECOLOGY
Payer: MEDICAID

## 2023-07-03 VITALS
WEIGHT: 259.93 LBS | RESPIRATION RATE: 18 BRPM | HEIGHT: 64 IN | HEART RATE: 85 BPM | SYSTOLIC BLOOD PRESSURE: 121 MMHG | DIASTOLIC BLOOD PRESSURE: 69 MMHG | TEMPERATURE: 99 F

## 2023-07-03 DIAGNOSIS — Z98.891 HISTORY OF UTERINE SCAR FROM PREVIOUS SURGERY: Chronic | ICD-10-CM

## 2023-07-03 DIAGNOSIS — Z98.891 HISTORY OF UTERINE SCAR FROM PREVIOUS SURGERY: ICD-10-CM

## 2023-07-03 LAB
BASOPHILS # BLD AUTO: 0.02 K/UL — SIGNIFICANT CHANGE UP (ref 0–0.2)
BASOPHILS NFR BLD AUTO: 0.3 % — SIGNIFICANT CHANGE UP (ref 0–2)
BLD GP AB SCN SERPL QL: NEGATIVE — SIGNIFICANT CHANGE UP
COVID-19 SPIKE DOMAIN AB INTERP: POSITIVE
COVID-19 SPIKE DOMAIN ANTIBODY RESULT: >250 U/ML — HIGH
EOSINOPHIL # BLD AUTO: 0.09 K/UL — SIGNIFICANT CHANGE UP (ref 0–0.5)
EOSINOPHIL NFR BLD AUTO: 1.4 % — SIGNIFICANT CHANGE UP (ref 0–6)
GLUCOSE BLDC GLUCOMTR-MCNC: 74 MG/DL — SIGNIFICANT CHANGE UP (ref 70–99)
HCT VFR BLD CALC: 31.9 % — LOW (ref 34.5–45)
HGB BLD-MCNC: 10.2 G/DL — LOW (ref 11.5–15.5)
IANC: 4.32 K/UL — SIGNIFICANT CHANGE UP (ref 1.8–7.4)
IMM GRANULOCYTES NFR BLD AUTO: 0.5 % — SIGNIFICANT CHANGE UP (ref 0–0.9)
LYMPHOCYTES # BLD AUTO: 1.58 K/UL — SIGNIFICANT CHANGE UP (ref 1–3.3)
LYMPHOCYTES # BLD AUTO: 23.8 % — SIGNIFICANT CHANGE UP (ref 13–44)
MCHC RBC-ENTMCNC: 27.4 PG — SIGNIFICANT CHANGE UP (ref 27–34)
MCHC RBC-ENTMCNC: 32 GM/DL — SIGNIFICANT CHANGE UP (ref 32–36)
MCV RBC AUTO: 85.8 FL — SIGNIFICANT CHANGE UP (ref 80–100)
MONOCYTES # BLD AUTO: 0.59 K/UL — SIGNIFICANT CHANGE UP (ref 0–0.9)
MONOCYTES NFR BLD AUTO: 8.9 % — SIGNIFICANT CHANGE UP (ref 2–14)
NEUTROPHILS # BLD AUTO: 4.32 K/UL — SIGNIFICANT CHANGE UP (ref 1.8–7.4)
NEUTROPHILS NFR BLD AUTO: 65.1 % — SIGNIFICANT CHANGE UP (ref 43–77)
NRBC # BLD: 0 /100 WBCS — SIGNIFICANT CHANGE UP (ref 0–0)
NRBC # FLD: 0 K/UL — SIGNIFICANT CHANGE UP (ref 0–0)
PLATELET # BLD AUTO: 226 K/UL — SIGNIFICANT CHANGE UP (ref 150–400)
RBC # BLD: 3.72 M/UL — LOW (ref 3.8–5.2)
RBC # FLD: 16.5 % — HIGH (ref 10.3–14.5)
RH IG SCN BLD-IMP: POSITIVE — SIGNIFICANT CHANGE UP
SARS-COV-2 IGG+IGM SERPL QL IA: >250 U/ML — HIGH
SARS-COV-2 IGG+IGM SERPL QL IA: POSITIVE
WBC # BLD: 6.63 K/UL — SIGNIFICANT CHANGE UP (ref 3.8–10.5)
WBC # FLD AUTO: 6.63 K/UL — SIGNIFICANT CHANGE UP (ref 3.8–10.5)

## 2023-07-03 PROCEDURE — 88302 TISSUE EXAM BY PATHOLOGIST: CPT | Mod: 26

## 2023-07-03 RX ORDER — TETANUS TOXOID, REDUCED DIPHTHERIA TOXOID AND ACELLULAR PERTUSSIS VACCINE, ADSORBED 5; 2.5; 8; 8; 2.5 [IU]/.5ML; [IU]/.5ML; UG/.5ML; UG/.5ML; UG/.5ML
0.5 SUSPENSION INTRAMUSCULAR ONCE
Refills: 0 | Status: COMPLETED | OUTPATIENT
Start: 2023-07-03

## 2023-07-03 RX ORDER — SODIUM CHLORIDE 9 MG/ML
1000 INJECTION, SOLUTION INTRAVENOUS
Refills: 0 | Status: DISCONTINUED | OUTPATIENT
Start: 2023-07-03 | End: 2023-07-03

## 2023-07-03 RX ORDER — ONDANSETRON 8 MG/1
4 TABLET, FILM COATED ORAL EVERY 6 HOURS
Refills: 0 | Status: DISCONTINUED | OUTPATIENT
Start: 2023-07-03 | End: 2023-07-04

## 2023-07-03 RX ORDER — SIMETHICONE 80 MG/1
80 TABLET, CHEWABLE ORAL EVERY 4 HOURS
Refills: 0 | Status: DISCONTINUED | OUTPATIENT
Start: 2023-07-03 | End: 2023-07-05

## 2023-07-03 RX ORDER — OXYTOCIN 10 UNIT/ML
333.33 VIAL (ML) INJECTION
Qty: 20 | Refills: 0 | Status: DISCONTINUED | OUTPATIENT
Start: 2023-07-03 | End: 2023-07-04

## 2023-07-03 RX ORDER — KETOROLAC TROMETHAMINE 30 MG/ML
30 SYRINGE (ML) INJECTION EVERY 6 HOURS
Refills: 0 | Status: DISCONTINUED | OUTPATIENT
Start: 2023-07-03 | End: 2023-07-04

## 2023-07-03 RX ORDER — SODIUM CHLORIDE 9 MG/ML
1000 INJECTION, SOLUTION INTRAVENOUS ONCE
Refills: 0 | Status: COMPLETED | OUTPATIENT
Start: 2023-07-03 | End: 2023-07-03

## 2023-07-03 RX ORDER — OXYCODONE HYDROCHLORIDE 5 MG/1
5 TABLET ORAL
Refills: 0 | Status: DISCONTINUED | OUTPATIENT
Start: 2023-07-03 | End: 2023-07-05

## 2023-07-03 RX ORDER — DIPHENHYDRAMINE HCL 50 MG
25 CAPSULE ORAL EVERY 6 HOURS
Refills: 0 | Status: DISCONTINUED | OUTPATIENT
Start: 2023-07-03 | End: 2023-07-05

## 2023-07-03 RX ORDER — HEPARIN SODIUM 5000 [USP'U]/ML
10000 INJECTION INTRAVENOUS; SUBCUTANEOUS EVERY 12 HOURS
Refills: 0 | Status: DISCONTINUED | OUTPATIENT
Start: 2023-07-03 | End: 2023-07-05

## 2023-07-03 RX ORDER — SODIUM CHLORIDE 9 MG/ML
1000 INJECTION, SOLUTION INTRAVENOUS ONCE
Refills: 0 | Status: DISCONTINUED | OUTPATIENT
Start: 2023-07-03 | End: 2023-07-03

## 2023-07-03 RX ORDER — IBUPROFEN 200 MG
600 TABLET ORAL EVERY 6 HOURS
Refills: 0 | Status: COMPLETED | OUTPATIENT
Start: 2023-07-03 | End: 2024-05-31

## 2023-07-03 RX ORDER — FAMOTIDINE 10 MG/ML
20 INJECTION INTRAVENOUS ONCE
Refills: 0 | Status: COMPLETED | OUTPATIENT
Start: 2023-07-03 | End: 2023-07-03

## 2023-07-03 RX ORDER — OXYCODONE HYDROCHLORIDE 5 MG/1
5 TABLET ORAL ONCE
Refills: 0 | Status: DISCONTINUED | OUTPATIENT
Start: 2023-07-03 | End: 2023-07-05

## 2023-07-03 RX ORDER — MAGNESIUM HYDROXIDE 400 MG/1
30 TABLET, CHEWABLE ORAL
Refills: 0 | Status: DISCONTINUED | OUTPATIENT
Start: 2023-07-03 | End: 2023-07-05

## 2023-07-03 RX ORDER — SODIUM CHLORIDE 9 MG/ML
1000 INJECTION, SOLUTION INTRAVENOUS
Refills: 0 | Status: DISCONTINUED | OUTPATIENT
Start: 2023-07-03 | End: 2023-07-04

## 2023-07-03 RX ORDER — ACETAMINOPHEN 500 MG
975 TABLET ORAL
Refills: 0 | Status: DISCONTINUED | OUTPATIENT
Start: 2023-07-03 | End: 2023-07-05

## 2023-07-03 RX ORDER — CITRIC ACID/SODIUM CITRATE 300-500 MG
30 SOLUTION, ORAL ORAL ONCE
Refills: 0 | Status: COMPLETED | OUTPATIENT
Start: 2023-07-03 | End: 2023-07-03

## 2023-07-03 RX ORDER — LANOLIN
1 OINTMENT (GRAM) TOPICAL EVERY 6 HOURS
Refills: 0 | Status: DISCONTINUED | OUTPATIENT
Start: 2023-07-03 | End: 2023-07-05

## 2023-07-03 RX ADMIN — SODIUM CHLORIDE 250 MILLILITER(S): 9 INJECTION, SOLUTION INTRAVENOUS at 13:38

## 2023-07-03 RX ADMIN — SODIUM CHLORIDE 200 MILLILITER(S): 9 INJECTION, SOLUTION INTRAVENOUS at 13:38

## 2023-07-03 RX ADMIN — HEPARIN SODIUM 10000 UNIT(S): 5000 INJECTION INTRAVENOUS; SUBCUTANEOUS at 22:45

## 2023-07-03 RX ADMIN — Medication 30 MILLILITER(S): at 13:39

## 2023-07-03 RX ADMIN — Medication 1000 MILLIUNIT(S)/MIN: at 17:16

## 2023-07-03 RX ADMIN — SODIUM CHLORIDE 75 MILLILITER(S): 9 INJECTION, SOLUTION INTRAVENOUS at 17:16

## 2023-07-03 RX ADMIN — Medication 30 MILLIGRAM(S): at 22:49

## 2023-07-03 RX ADMIN — SODIUM CHLORIDE 1000 MILLILITER(S): 9 INJECTION, SOLUTION INTRAVENOUS at 18:10

## 2023-07-03 RX ADMIN — Medication 30 MILLIGRAM(S): at 23:30

## 2023-07-03 RX ADMIN — FAMOTIDINE 20 MILLIGRAM(S): 10 INJECTION INTRAVENOUS at 13:40

## 2023-07-03 RX ADMIN — ONDANSETRON 4 MILLIGRAM(S): 8 TABLET, FILM COATED ORAL at 22:25

## 2023-07-03 NOTE — OB POSTPARTUM EVENT NOTE - NS_EVENTFINDINGS1_OBGYN_ALL_OB_FT
urine out put low; last hour 40cc and concentrated.    Patient to receive 1L bolus  and increase PO intake

## 2023-07-03 NOTE — OB PROVIDER H&P - NSHPPHYSICALEXAM_GEN_ALL_CORE
Vitals: ICU Vital Signs Last 24 Hrs  T(C): 37.1 (03 Jul 2023 12:10), Max: 37.1 (03 Jul 2023 12:10)  T(F): 98.78 (03 Jul 2023 12:10), Max: 98.78 (03 Jul 2023 12:10)  HR: 85 (03 Jul 2023 12:10) (85 - 85)  BP: 121/69 (03 Jul 2023 12:10) (121/69 - 121/69)  BP(mean): --  ABP: --  ABP(mean): --  RR: --  SpO2: --    General: A&O x3  Heart: RRR, S1 & S2  Lungs: CTA b/l, good inspiratory/expiratory effort. No ronchi, no rales  Abdomen: Soft, obese, Gravid, TOCO in place, +pfannenstial scar    NSt: pending    Extremities: FROM, bilateral 1+ LE edema  Neuro: grossly intact

## 2023-07-03 NOTE — OB PROVIDER DELIVERY SUMMARY - NSPROVIDERDELIVERYNOTE_OBGYN_ALL_OB_FT
Low transverse hysterotomy, head delivered from LOT presentation atraumatically, body followed easily with fundal pressure. Nose and mouth suctioned. APGARS 8/9. Delayed cord clamping. Viable baby girl. Placenta delivered spontaneously and intact. Cord blood obtained. Hysterotomy repaired with vicryl suture, small lower segment extension repaired. Excellent hemostasis obtained. Normal ovaries b/l. Mid portion of both tubes adherent to the ovary and to avoid injury and bleeding. Fimbriated portion and proximal portion of both tubes was amputated with Ligasure device hemostatically and sent to pathology.   Muscle reapproximated. Fascia closed, Sq layer and skin closed with suture. Baby and mother in good condition transferred to PACU. QBL 491cc.  MD cathy Low transverse hysterotomy and partial bilateral salpingectomy, head delivered from LOT presentation atraumatically, body followed easily with fundal pressure. Nose and mouth suctioned. APGARS 8/9. Delayed cord clamping. Viable baby girl. Placenta delivered spontaneously and intact. Cord blood obtained. Hysterotomy repaired with vicryl suture, small lower segment extension repaired. Excellent hemostasis obtained. Normal ovaries b/l. Mid portion of both tubes adherent to the ovary and to avoid injury and bleeding. Fimbriated portion and proximal portion of both tubes was amputated with Ligasure device hemostatically and sent to pathology.   Muscle reapproximated. Fascia closed, Sq layer and skin closed with suture. Baby and mother in good condition transferred to PACU. EILEEN 491cc.  MD cathy    Dictation#[id number]

## 2023-07-03 NOTE — OB PROVIDER DELIVERY SUMMARY - NSBEGANLABOR_OBGYN_A_OB
Patient:   JIMMY IBARRA            MRN: [id number]            FIN: [id number]               Age:   1 years     Sex:  Female     :  2017   Associated Diagnoses:   None   Author:   Britni Bonilla PA-C      History of Present Illness   The patient presents with fever and seen by pcp 2d ago, dx w/ ear infection, on abx for ear infection, started w/ fever/cough today, sibling dx w/ influenza, no meds for fever, vomited earlier.  The onset was 2  hours ago.  The course/duration of symptoms is worsening.  Associated symptoms: cough, ear ache, vomiting, fussiness, denies shortness of breath, denies rhinorrhea, denies sore throat, denies diarrhea, denies abdominal pain, denies headache, denies rash and denies decreased oral intake.  Temperature is 103.3  Fahrenheit.  Therapy today: none.        Review of Systems   Constitutional symptoms:  Fever, No decreased activity,    Skin symptoms:  No rash,    Eye symptoms:  No discharge,    ENMT symptoms:  No nasal congestion,    Respiratory symptoms:  Cough, No shortness of breath,    Cardiovascular symptoms:  No history of heart murmurs.   Gastrointestinal symptoms:  No vomiting, no diarrhea.    Genitourinary symptoms:  Normal urine output.   Musculoskeletal symptoms:  No joint swelling.   Neurologic symptoms:  No weakness.      Health Status   Allergies:    Allergic Reactions (Selected)  NKA.   Medications:  (Selected)   Inpatient Medications  Ordered  acetaminophen (Tylenol) pediatric rectal suppository: 154.5 mg, 1.93 supp, IA, q4hr, PRN: Pain / Fever.      Past Medical/ Family/ Social History      Medical history   Respiratory: no bronchitis, no bronchiolitis.   Surgical history: Negative.   Social history: Family/social situation: Lives with parent(s).      Physical Examination               Vital Signs   Vital Signs   3/21/2018 11:28          Temperature Rectal        103.3 F  HI    3/21/2018 11:24          Temperature Rectal        103.3 F  HI                              Peripheral Pulse Rate     179 bpm  HI                             Respiratory Rate          24 br/min                             Systolic Blood Pressure   101 mmHg                             Diastolic Blood Pressure  59 mmHg                             Mean Arterial Pressure    73 mmHg                             Oxygen Saturation         100 %  .               Per nurse's notes.   Measurements   3/21/2018 11:24          Height                    0 cm                             Weight                    10.3 kg                             Weight Type               Measured                             Weight Method             Other: baby    3/21/2018 11:21          Height                    0 cm                             Weight                    10.3 kg  .   Vital signs were reviewed.   Pulse Ox > 95% on Room Air which is normal for this patient.   General:  Appropriate for age, no acute distress, Happy, smiling, interactive with examiner.    Skin:  Warm, dry.    Head:  Normocephalic, atraumatic.    Neck:  Supple, trachea midline.    Eye:  Pupils are equal, round and reactive to light, extraocular movements are intact.    Ears, nose, mouth and throat:  Tympanic membranes clear, oral mucosa moist, no pharyngeal erythema or exudate.    Cardiovascular:  Regular rate and rhythm, No murmur.    Respiratory:  Lungs are clear to auscultation, respirations are non-labored, Symmetrical chest wall expansion.    Chest wall:  No deformity.   Back:  Nontender.   Musculoskeletal:  Normal ROM, no tenderness.    Gastrointestinal:  Soft, Nontender, Non distended.    Neurological:  No focal neurological deficits.      Medical Decision Making   Differential Diagnosis:  Fever, viral syndrome, pneumonia, bronchitis, broncholitis, otitis media, upper respiratory infection, sinusitis, urinary tract infection, pyelonephritis, pharyngitis, gastroenteritis, sepsis, bacteremia, influenza, meningitis.    Notes:  1-year-old  female presents to the emergency department with fever and cough, brother diagnosed with influenza 2 days ago, patient is febrile tachycardic upon presentation although nontoxic and in no acute distress, HPI and PE as above, clinically patient has influenza, fever and heart rate trending down with Children's Motrin and Tylenol in the department, supportive treatment only at this time, patient is well-appearing, will follow up with pediatrician or return for worsening signs or symptoms..      Reexamination/ Reevaluation   fever/HR appropriately trending down      Impression and Plan   Diagnosis   fever, i nfluenza   Plan   Condition: Improved, Stable.    Disposition: Discharged: to home.    Prescriptions: Launch prescriptions   Pharmacy:  Tamiflu 15 mg/mL oral suspension (extemporaneous) (Prescribe): 30 mg, PO, BID, for 5 day, 25 mL, 0 Refill(s).    Patient was given the following educational materials: Influenza, Child.    Follow up with: Follow up w/ your pediatrician, call for an appt, return for any concerns Within 2 to 3 days Push fluids  Alternate children's tylenol and motrin every 3-4hrs for fever  Continue taking the amoxicillin  Take tamiflu as directed.    Counseled: Patient, Family, Regarding diagnosis, Regarding treatment plan, Regarding prescription, Patient indicated understanding of instructions.         N/A

## 2023-07-03 NOTE — OB RN DELIVERY SUMMARY - BABY A: APGAR 5 MIN MUSCLE TONE, DELIVERY
Pt walked in c/o nonproductive cough and body aches x1 week. Denies recent travels or sick contacts. States "my body feels hot and then it feels cold". Denies sob, cp, fevers, chills.
(2) well flexed

## 2023-07-03 NOTE — OB PROVIDER H&P - ALERT: PERTINENT HISTORY
Patient desires tubal ligation/20 Week Level II Sonogram/BioPhysical Profile(s)/Follow up Sonogram for Growth/Non Invasive Prenatal Screen (NIPS)

## 2023-07-03 NOTE — OB PROVIDER H&P - NSHPSOCIALHISTORY_GEN_ALL_CORE
Lives at home with  and children, feels safe. Denies alcohol/tobacco/drug use during pregnancy. Denies history of Gonorrhea, Chalmydia, HIV, HSV, STIs.    Psych: Denies PPD, depression, anxiety

## 2023-07-03 NOTE — OB PROVIDER H&P - HISTORY OF PRESENT ILLNESS
31yo female  SONIA 7/10/2023 presents @39 weeks for scheduled rLTCS & bilateral salpingectomy. Denies shortness of breath, fever, chills or cough.  Denies vaginal bleeding, leakage of fluids, or contractions today. Reports positive fetal movement.    Antepartum Course: anatomy scan wnl, passed GCT, GBS positive 23, NIPT low risk  Prenatal labs:  -T&S: B+  -Rubella: Immune  -Hep B: Nonreactive  -HIV: Nonreactive  -RPR: Negative  Ultrasounds: Last MFM sonogram EFW 3413g, 94th %, AC 95th% (23)

## 2023-07-03 NOTE — OB PROVIDER H&P - NS_OBGYNHISTORY_OBGYN_ALL_OB_FT
: 2/10/2017, pC/s, FT, 8#10, A1, Arrest of dilitation @5cm  G2: 2019, rC/s, 39w, LTCS, A1, 8#7  G3: current    Gyn Hx: +Anterior 1cm fibroid, Denies ovarian cysts, abnormal pap smears

## 2023-07-03 NOTE — OB RN PREOPERATIVE CHECKLIST - NOTHING BY MOUTH SINCE
02-Jul-2023 18:30 Detail Level: Detailed Instructions (Optional): going on vacation to [place] Introduction Text (Please End With A Colon): Deferred procedure. Procedure To Be Performed At Next Visit: Biopsy by punch method

## 2023-07-03 NOTE — OB PROVIDER H&P - ASSESSMENT
33yo  @ 39w  Dx: Scheduled rLTCS  Dx: GDMA1  Dx: Suspected LGA, AC 95th%, EFW 94th%  Dx: BMI 44    - Admit to L&D  - NPO  - EFM/TOCO  - IV access, CBC/T&C/RPR  - Pepcid and Bicitra as per pre-op policy  - 2 units PRBCs  - Anesthesia consult   - Blood transfusion consent  - Operative Consent to be discussed and obtained by Dr. Kaur  - Plan to move to OR on time schedule  - Discussed with Dr. Vincent Newberry, PAC

## 2023-07-03 NOTE — OB RN PATIENT PROFILE - FUNCTIONAL ASSESSMENT - DAILY ACTIVITY 6.
-defer statin in the setting of recent liver x-plant.     discussed w/pt and team  pager: [phone]   office: [phone]    -I spent a total time of 25 mins with the patient at bedside/on unit of which more than 50% of time was spent on counseling/coordination of care. -defer statin in the setting of recent liver x-plant. 4 = No assist / stand by assistance

## 2023-07-03 NOTE — OB PROVIDER H&P - ATTENDING COMMENTS
Patient was seen immediately preop. No c/o. Patient is scheduled for RCD, BS for sterilization. LGA baby at term with hx of GDMA1 controlled.   Risks of surgery: pain, bleeding, infection, injury to adjacent organs and vessels, scar formation, possible hysterectomy, possible fetal dystocia at delivery, permanent nature of sterilization, possible need for hysterectomy, recovery and follow up reviewed with patient. Informed consent signed. Plan: to OR.   MD cathy

## 2023-07-03 NOTE — OB PROVIDER H&P - NSLOWPPHRISK_OBGYN_A_OB
Bee Pregnancy/Less than or equal to 4 previous vaginal births/No known bleeding disorder/No history of postpartum hemorrhage/No other PPH risks indicated

## 2023-07-03 NOTE — OB PROVIDER DELIVERY SUMMARY - NSSELHIDDEN_OBGYN_ALL_OB_FT
[NS_DeliveryAttending1_OBGYN_ALL_OB_FT:[id number]==] [NS_DeliveryAttending1_OBGYN_ALL_OB_FT:[id number]==],[NS_DeliveryAssist1_OBGYN_ALL_OB_FT:[id number]=]

## 2023-07-04 ENCOUNTER — TRANSCRIPTION ENCOUNTER (OUTPATIENT)
Age: 33
End: 2023-07-04

## 2023-07-04 LAB
BASOPHILS # BLD AUTO: 0.02 K/UL — SIGNIFICANT CHANGE UP (ref 0–0.2)
BASOPHILS NFR BLD AUTO: 0.2 % — SIGNIFICANT CHANGE UP (ref 0–2)
EOSINOPHIL # BLD AUTO: 0.04 K/UL — SIGNIFICANT CHANGE UP (ref 0–0.5)
EOSINOPHIL NFR BLD AUTO: 0.4 % — SIGNIFICANT CHANGE UP (ref 0–6)
HCT VFR BLD CALC: 29.6 % — LOW (ref 34.5–45)
HGB BLD-MCNC: 9.6 G/DL — LOW (ref 11.5–15.5)
IANC: 8.13 K/UL — HIGH (ref 1.8–7.4)
IMM GRANULOCYTES NFR BLD AUTO: 0.3 % — SIGNIFICANT CHANGE UP (ref 0–0.9)
LYMPHOCYTES # BLD AUTO: 1.75 K/UL — SIGNIFICANT CHANGE UP (ref 1–3.3)
LYMPHOCYTES # BLD AUTO: 15.7 % — SIGNIFICANT CHANGE UP (ref 13–44)
MCHC RBC-ENTMCNC: 27.4 PG — SIGNIFICANT CHANGE UP (ref 27–34)
MCHC RBC-ENTMCNC: 32.4 GM/DL — SIGNIFICANT CHANGE UP (ref 32–36)
MCV RBC AUTO: 84.6 FL — SIGNIFICANT CHANGE UP (ref 80–100)
MONOCYTES # BLD AUTO: 1.17 K/UL — HIGH (ref 0–0.9)
MONOCYTES NFR BLD AUTO: 10.5 % — SIGNIFICANT CHANGE UP (ref 2–14)
NEUTROPHILS # BLD AUTO: 8.13 K/UL — HIGH (ref 1.8–7.4)
NEUTROPHILS NFR BLD AUTO: 72.9 % — SIGNIFICANT CHANGE UP (ref 43–77)
NRBC # BLD: 0 /100 WBCS — SIGNIFICANT CHANGE UP (ref 0–0)
NRBC # FLD: 0 K/UL — SIGNIFICANT CHANGE UP (ref 0–0)
PLATELET # BLD AUTO: 259 K/UL — SIGNIFICANT CHANGE UP (ref 150–400)
RBC # BLD: 3.5 M/UL — LOW (ref 3.8–5.2)
RBC # FLD: 16.2 % — HIGH (ref 10.3–14.5)
T PALLIDUM AB TITR SER: NEGATIVE — SIGNIFICANT CHANGE UP
WBC # BLD: 11.14 K/UL — HIGH (ref 3.8–10.5)
WBC # FLD AUTO: 11.14 K/UL — HIGH (ref 3.8–10.5)

## 2023-07-04 RX ORDER — FERROUS SULFATE 325(65) MG
0 TABLET ORAL
Qty: 0 | Refills: 0 | DISCHARGE

## 2023-07-04 RX ORDER — FERROUS SULFATE 325(65) MG
1 TABLET ORAL
Qty: 30 | Refills: 1
Start: 2023-07-04 | End: 2023-09-01

## 2023-07-04 RX ORDER — ACETAMINOPHEN 500 MG
2 TABLET ORAL
Qty: 30 | Refills: 0
Start: 2023-07-04 | End: 2023-07-08

## 2023-07-04 RX ORDER — IBUPROFEN 200 MG
1 TABLET ORAL
Qty: 30 | Refills: 0
Start: 2023-07-04 | End: 2023-07-08

## 2023-07-04 RX ORDER — ASPIRIN/CALCIUM CARB/MAGNESIUM 324 MG
1 TABLET ORAL
Refills: 0 | DISCHARGE

## 2023-07-04 RX ORDER — TETANUS TOXOID, REDUCED DIPHTHERIA TOXOID AND ACELLULAR PERTUSSIS VACCINE, ADSORBED 5; 2.5; 8; 8; 2.5 [IU]/.5ML; [IU]/.5ML; UG/.5ML; UG/.5ML; UG/.5ML
0.5 SUSPENSION INTRAMUSCULAR ONCE
Refills: 0 | Status: COMPLETED | OUTPATIENT
Start: 2023-07-04 | End: 2023-07-04

## 2023-07-04 RX ORDER — IBUPROFEN 200 MG
600 TABLET ORAL EVERY 6 HOURS
Refills: 0 | Status: DISCONTINUED | OUTPATIENT
Start: 2023-07-04 | End: 2023-07-05

## 2023-07-04 RX ADMIN — Medication 975 MILLIGRAM(S): at 09:33

## 2023-07-04 RX ADMIN — Medication 600 MILLIGRAM(S): at 23:47

## 2023-07-04 RX ADMIN — TETANUS TOXOID, REDUCED DIPHTHERIA TOXOID AND ACELLULAR PERTUSSIS VACCINE, ADSORBED 0.5 MILLILITER(S): 5; 2.5; 8; 8; 2.5 SUSPENSION INTRAMUSCULAR at 06:40

## 2023-07-04 RX ADMIN — Medication 30 MILLIGRAM(S): at 06:42

## 2023-07-04 RX ADMIN — HEPARIN SODIUM 10000 UNIT(S): 5000 INJECTION INTRAVENOUS; SUBCUTANEOUS at 12:21

## 2023-07-04 RX ADMIN — Medication 975 MILLIGRAM(S): at 02:17

## 2023-07-04 RX ADMIN — Medication 600 MILLIGRAM(S): at 19:03

## 2023-07-04 RX ADMIN — Medication 975 MILLIGRAM(S): at 02:50

## 2023-07-04 RX ADMIN — Medication 600 MILLIGRAM(S): at 18:17

## 2023-07-04 RX ADMIN — Medication 30 MILLIGRAM(S): at 12:21

## 2023-07-04 RX ADMIN — Medication 975 MILLIGRAM(S): at 21:50

## 2023-07-04 RX ADMIN — Medication 975 MILLIGRAM(S): at 21:04

## 2023-07-04 RX ADMIN — Medication 30 MILLIGRAM(S): at 12:40

## 2023-07-04 RX ADMIN — Medication 975 MILLIGRAM(S): at 10:30

## 2023-07-04 RX ADMIN — Medication 30 MILLIGRAM(S): at 07:42

## 2023-07-04 RX ADMIN — HEPARIN SODIUM 10000 UNIT(S): 5000 INJECTION INTRAVENOUS; SUBCUTANEOUS at 23:48

## 2023-07-04 NOTE — DISCHARGE NOTE OB - MEDICATION SUMMARY - MEDICATIONS TO TAKE
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

## 2023-07-04 NOTE — DISCHARGE NOTE OB - CARE PLAN
Principal Discharge DX:	Term birth of female   Assessment and plan of treatment:	Pelvic rest x 6wks  Regular diet  No heavy lifting x 8wks  Secondary Diagnosis:	Iron deficiency anemia due to chronic blood loss  Assessment and plan of treatment:	Improve anemia with iron supplements and iron rich food   1 Principal Discharge DX:	Term birth of female   Assessment and plan of treatment:	Pelvic rest x 6wks  Regular diet  No heavy lifting x 8wks  Secondary Diagnosis:	Iron deficiency anemia due to chronic blood loss  Assessment and plan of treatment:	Improve anemia with iron supplements and iron rich food  Secondary Diagnosis:	Gestational diabetes  Assessment and plan of treatment:	3 HR Glucose tolerance test in 6 weeks

## 2023-07-04 NOTE — PROGRESS NOTE ADULT - SUBJECTIVE AND OBJECTIVE BOX
R1 Progress Note    Patient seen and examined at bedside, no acute overnight events. No acute complaints, pain well controlled. Patient is ambulating and tolerating regular diet. Has not yet passed flatus. Briones has been removed. Denies CP, SOB, N/V, HA, blurred vision, epigastric pain.    Vital Signs Last 24 Hours  T(C): 37.1 (07-03-23 @ 23:45), Max: 37.1 (07-03-23 @ 12:07)  HR: 100 (07-03-23 @ 23:45) (54 - 100)  BP: 109/58 (07-03-23 @ 23:45) (99/54 - 121/69)  RR: 19 (07-03-23 @ 23:45) (10 - 19)  SpO2: 97% (07-03-23 @ 23:45) (96% - 100%)    I&O's Summary    03 Jul 2023 07:01  -  04 Jul 2023 04:55  --------------------------------------------------------  IN: 4149 mL / OUT: 2209 mL / NET: 1940 mL        Physical Exam:  General: NAD  Abdomen: Soft, non-tender, non-distended, fundus firm  Incision: Pfannenstiel incision CDI, subcuticular suture closure  Pelvic: Lochia wnl    Labs:    Blood Type: B Positive  Antibody Screen: Negative               10.2   6.63  )-----------( 226      ( 07-03 @ 12:41 )             31.9                10.5   7.09  )-----------( 223      ( 06-30 @ 14:11 )             33.3         MEDICATIONS  (STANDING):  acetaminophen     Tablet .. 975 milliGRAM(s) Oral <User Schedule>  dextrose 5% + lactated ringers. 1000 milliLiter(s) (250 mL/Hr) IV Continuous <Continuous>  diphtheria/tetanus/pertussis (acellular) Vaccine (Adacel) 0.5 milliLiter(s) IntraMuscular once  heparin   Injectable 24175 Unit(s) SubCutaneous every 12 hours  ibuprofen  Tablet. 600 milliGRAM(s) Oral every 6 hours  ketorolac   Injectable 30 milliGRAM(s) IV Push every 6 hours  lactated ringers. 1000 milliLiter(s) (75 mL/Hr) IV Continuous <Continuous>  oxytocin Infusion 333.333 milliUNIT(s)/Min (1000 mL/Hr) IV Continuous <Continuous>    MEDICATIONS  (PRN):  diphenhydrAMINE 25 milliGRAM(s) Oral every 6 hours PRN Pruritus  lanolin Ointment 1 Application(s) Topical every 6 hours PRN Sore Nipples  magnesium hydroxide Suspension 30 milliLiter(s) Oral two times a day PRN Constipation  ondansetron Injectable 4 milliGRAM(s) IV Push every 6 hours PRN Nausea  oxyCODONE    IR 5 milliGRAM(s) Oral once PRN Moderate to Severe Pain (4-10)  oxyCODONE    IR 5 milliGRAM(s) Oral every 3 hours PRN Moderate to Severe Pain (4-10)  simethicone 80 milliGRAM(s) Chew every 4 hours PRN Gas

## 2023-07-04 NOTE — DISCHARGE NOTE OB - PLAN OF CARE
Improve anemia with iron supplements and iron rich food Pelvic rest x 6wks  Regular diet  No heavy lifting x 8wks 3 HR Glucose tolerance test in 6 weeks

## 2023-07-04 NOTE — DISCHARGE NOTE OB - CARE PROVIDER_API CALL
Keisha Kaur)  Obstetrics and Gynecology  [address] [place] [address]  Phone: [phone]  Fax: [phone]  Follow Up Time:

## 2023-07-04 NOTE — DISCHARGE NOTE OB - NS MD DC FALL RISK RISK
For information on Fall & Injury Prevention, visit: [email or web address] OR  [email or web address] OR  https://www.cdc.gov/steadi/patient.html

## 2023-07-04 NOTE — CHART NOTE - NSCHARTNOTEFT_GEN_A_CORE
Attending Note    Patient evaluated at bedside. Tolerating diet, out of bed. No flatus, ambulating, lochia light. Denies dizziness, shortness of breath, chest pain, palpitations, etc  VS T 36.9  P 79  R 18  /63  o 2 sat 99% RA    Heent nl  abd obese, soft Incision C/D/I  Ext mild b/l pedal edema  Neuro AAox3  labs: H/H 9.6/29.6  WBC 11 Plts 259K    A: 33 yo POD#1 s/p RCS, asymptomatic anemia,  clinically stable  P: Iron/VitC     Routine postop care     DC planning POD#2     Instructions reviewed with patient    MBeanyasia

## 2023-07-04 NOTE — DISCHARGE NOTE OB - YES, WALK AS TOLERATED
How Severe Is Your Acne?: mild Is This A New Presentation, Or A Follow-Up?: Follow Up Acne Statement Selected

## 2023-07-04 NOTE — PROGRESS NOTE ADULT - ASSESSMENT
31y/o  POD#1 from planned, uncomplicated rLTCS. No significant past medical history. No current issues and feels her pain is well controlled. Patient is hemodynamically stable and progressing well post-op.    #Postpartum state  - Continue with po analgesia  - Increase ambulation  - Continue regular diet  - Check CBC  - Incision dressing removed  - DVT prophylaxis with 5000u heparin    Gabi Lo  PGY-1

## 2023-07-04 NOTE — DISCHARGE NOTE OB - PATIENT PORTAL LINK FT
You can access the FollowMyHealth Patient Portal offered by [facility] by registering at the following website: [email or web address]. By joining [employer]’s FollowMyHealth portal, you will also be able to view your health information using other applications (apps) compatible with our system.

## 2023-07-04 NOTE — DISCHARGE NOTE OB - HOSPITAL COURSE
Patient presented to labor and delivery for scheduled RCS and bilateral salpingectomy.  A live female infant was born. THe patient had uneventful hospital course and discharged on POD#2 in stable condition

## 2023-07-05 ENCOUNTER — APPOINTMENT (OUTPATIENT)
Dept: ANTEPARTUM | Facility: CLINIC | Age: 33
End: 2023-07-05

## 2023-07-05 VITALS
SYSTOLIC BLOOD PRESSURE: 112 MMHG | DIASTOLIC BLOOD PRESSURE: 71 MMHG | OXYGEN SATURATION: 100 % | HEART RATE: 82 BPM | TEMPERATURE: 98 F | RESPIRATION RATE: 18 BRPM

## 2023-07-05 RX ORDER — IBUPROFEN 200 MG
1 TABLET ORAL
Qty: 30 | Refills: 0
Start: 2023-07-05 | End: 2023-07-09

## 2023-07-05 RX ADMIN — Medication 975 MILLIGRAM(S): at 02:41

## 2023-07-05 RX ADMIN — Medication 600 MILLIGRAM(S): at 12:30

## 2023-07-05 RX ADMIN — Medication 975 MILLIGRAM(S): at 15:47

## 2023-07-05 RX ADMIN — HEPARIN SODIUM 10000 UNIT(S): 5000 INJECTION INTRAVENOUS; SUBCUTANEOUS at 11:34

## 2023-07-05 RX ADMIN — Medication 600 MILLIGRAM(S): at 06:15

## 2023-07-05 RX ADMIN — Medication 975 MILLIGRAM(S): at 16:30

## 2023-07-05 RX ADMIN — Medication 975 MILLIGRAM(S): at 09:21

## 2023-07-05 RX ADMIN — Medication 600 MILLIGRAM(S): at 05:45

## 2023-07-05 RX ADMIN — Medication 975 MILLIGRAM(S): at 03:20

## 2023-07-05 RX ADMIN — Medication 600 MILLIGRAM(S): at 00:16

## 2023-07-05 RX ADMIN — Medication 600 MILLIGRAM(S): at 11:33

## 2023-07-05 RX ADMIN — Medication 975 MILLIGRAM(S): at 08:21

## 2023-07-05 NOTE — PROGRESS NOTE ADULT - SUBJECTIVE AND OBJECTIVE BOX
ANESTHESIA POSTOP CHECK    32y Female POSTOP DAY 2     Vital Signs Last 24 Hrs  T(C): 36.7 (05 Jul 2023 05:23), Max: 37 (04 Jul 2023 21:21)  T(F): 98.1 (05 Jul 2023 05:23), Max: 98.6 (04 Jul 2023 21:21)  HR: 65 (05 Jul 2023 05:23) (61 - 100)  BP: 119/56 (05 Jul 2023 05:23) (91/54 - 119/56)  BP(mean): --  RR: 18 (05 Jul 2023 05:23) (18 - 18)  SpO2: 100% (05 Jul 2023 05:23) (100% - 100%)    Parameters below as of 04 Jul 2023 21:21  Patient On (Oxygen Delivery Method): room air      I&O's Summary      [X ] NO APPARENT ANESTHESIA COMPLICATIONS      Comments:

## 2023-07-05 NOTE — PROGRESS NOTE ADULT - SUBJECTIVE AND OBJECTIVE BOX
Postpartum Note,  Section  She is a  32y woman who is now post-operative day: 2  Subjective:  The patient feels well.  She is ambulating.   She is tolerating regular diet.  She denies nausea and vomiting.  She is voiding.  Her pain is controlled.  She reports normal postpartum bleeding.    Physical exam:  Vital Signs Last 24 Hrs  T(C): 36.9 (2023 13:58), Max: 37 (2023 21:21)  T(F): 98.5 (2023 13:58), Max: 98.6 (2023 21:21)  HR: 91 (2023 13:58) (65 - 100)  BP: 131/73 (2023 13:58) (113/61 - 131/73)  BP(mean): --  RR: 18 (2023 13:58) (18 - 18)  SpO2: 100% (2023 13:58) (100% - 100%)    Parameters below as of 2023 13:58  Patient On (Oxygen Delivery Method): room air      Gen: NAD      LABS:                        9.6    11.14 )-----------( 259      ( 2023 06:15 )             29.6       Allergies    No Known Allergies    Intolerances      MEDICATIONS  (STANDING):  acetaminophen     Tablet .. 975 milliGRAM(s) Oral <User Schedule>  heparin   Injectable 23504 Unit(s) SubCutaneous every 12 hours  ibuprofen  Tablet. 600 milliGRAM(s) Oral every 6 hours    MEDICATIONS  (PRN):  diphenhydrAMINE 25 milliGRAM(s) Oral every 6 hours PRN Pruritus  lanolin Ointment 1 Application(s) Topical every 6 hours PRN Sore Nipples  magnesium hydroxide Suspension 30 milliLiter(s) Oral two times a day PRN Constipation  oxyCODONE    IR 5 milliGRAM(s) Oral every 3 hours PRN Moderate to Severe Pain (4-10)  oxyCODONE    IR 5 milliGRAM(s) Oral once PRN Moderate to Severe Pain (4-10)  simethicone 80 milliGRAM(s) Chew every 4 hours PRN Gas      Assessment and Plan  POD #2 s/p  section  Doing well.  Encourage ambulation  DC HOME TODAY

## 2023-07-05 NOTE — PROGRESS NOTE ADULT - SUBJECTIVE AND OBJECTIVE BOX
S: 31yo  POD#2 s/p LTCS. Pain is well controlled. She is tolerating a regular diet and passing flatus. She is voiding spontaneously, and ambulating without difficulty. Denies CP/SOB. Denies lightheadedness/dizziness. Denies N/V.    O:  Vitals:  Vital Signs Last 24 Hrs  T(C): 36.7 (05 Jul 2023 05:23), Max: 37 (04 Jul 2023 21:21)  T(F): 98.1 (05 Jul 2023 05:23), Max: 98.6 (04 Jul 2023 21:21)  HR: 65 (05 Jul 2023 05:23) (61 - 100)  BP: 119/56 (05 Jul 2023 05:23) (91/54 - 119/56)  BP(mean): --  RR: 18 (05 Jul 2023 05:23) (18 - 18)  SpO2: 100% (05 Jul 2023 05:23) (100% - 100%)    Parameters below as of 04 Jul 2023 21:21  Patient On (Oxygen Delivery Method): room air        MEDICATIONS  (STANDING):  acetaminophen     Tablet .. 975 milliGRAM(s) Oral <User Schedule>  heparin   Injectable 97349 Unit(s) SubCutaneous every 12 hours  ibuprofen  Tablet. 600 milliGRAM(s) Oral every 6 hours      MEDICATIONS  (PRN):  diphenhydrAMINE 25 milliGRAM(s) Oral every 6 hours PRN Pruritus  lanolin Ointment 1 Application(s) Topical every 6 hours PRN Sore Nipples  magnesium hydroxide Suspension 30 milliLiter(s) Oral two times a day PRN Constipation  oxyCODONE    IR 5 milliGRAM(s) Oral every 3 hours PRN Moderate to Severe Pain (4-10)  oxyCODONE    IR 5 milliGRAM(s) Oral once PRN Moderate to Severe Pain (4-10)  simethicone 80 milliGRAM(s) Chew every 4 hours PRN Gas      Labs:  Blood type: B Positive  Rubella IgG: RPR: Negative                          9.6<L>   11.14<H> >-----------< 259    ( 07-04 @ 06:15 )             29.6<L>                        10.2<L>   6.63 >-----------< 226    ( 07-03 @ 12:41 )             31.9<L>                  PE:  General: NAD  Abdomen: Soft, appropriately tender, incision c/d/i(Steris)  Lochia: WNL  Extremities: No calf tenderness    A/P: 31yo, POD#2 s/p LTCS.  Patient is stable and doing well post-operatively.    - Continue regular diet.  - Encouraged use of abdominal binder.  - Increase ambulation.  - Continue motrin, tylenol, oxycodone PRN for pain control.   -Discharge planning-Patient desires discharge today     TUNDE Cerda S: 33yo  POD#2 s/p LTCS. Pain is well controlled. She is tolerating a regular diet and passing flatus. She is voiding spontaneously, and ambulating without difficulty. Denies CP/SOB. Denies lightheadedness/dizziness. Denies N/V.    O:  Vitals:  Vital Signs Last 24 Hrs  T(C): 36.7 (05 Jul 2023 05:23), Max: 37 (04 Jul 2023 21:21)  T(F): 98.1 (05 Jul 2023 05:23), Max: 98.6 (04 Jul 2023 21:21)  HR: 65 (05 Jul 2023 05:23) (61 - 100)  BP: 119/56 (05 Jul 2023 05:23) (91/54 - 119/56)  BP(mean): --  RR: 18 (05 Jul 2023 05:23) (18 - 18)  SpO2: 100% (05 Jul 2023 05:23) (100% - 100%)    Parameters below as of 04 Jul 2023 21:21  Patient On (Oxygen Delivery Method): room air        MEDICATIONS  (STANDING):  acetaminophen     Tablet .. 975 milliGRAM(s) Oral <User Schedule>  heparin   Injectable 95941 Unit(s) SubCutaneous every 12 hours  ibuprofen  Tablet. 600 milliGRAM(s) Oral every 6 hours      MEDICATIONS  (PRN):  diphenhydrAMINE 25 milliGRAM(s) Oral every 6 hours PRN Pruritus  lanolin Ointment 1 Application(s) Topical every 6 hours PRN Sore Nipples  magnesium hydroxide Suspension 30 milliLiter(s) Oral two times a day PRN Constipation  oxyCODONE    IR 5 milliGRAM(s) Oral every 3 hours PRN Moderate to Severe Pain (4-10)  oxyCODONE    IR 5 milliGRAM(s) Oral once PRN Moderate to Severe Pain (4-10)  simethicone 80 milliGRAM(s) Chew every 4 hours PRN Gas      Labs:  Blood type: B Positive  Rubella IgG: RPR: Negative                          9.6<L>   11.14<H> >-----------< 259    ( 07-04 @ 06:15 )             29.6<L>                        10.2<L>   6.63 >-----------< 226    ( 07-03 @ 12:41 )             31.9<L>                  PE:  General: NAD  Abdomen: Soft, appropriately tender, incision c/d/i(Steris)  Lochia: WNL  Extremities: No calf tenderness    A/P: 33yo, POD#2 s/p LTCS.  Patient is stable and doing well post-operatively.    - Continue regular diet.  - Encouraged use of abdominal binder.  - Increase ambulation.  - Continue motrin, tylenol, oxycodone PRN for pain control.   -GTT in 6 weeks for GDM  -Discharge planning-Patient desires discharge today     TUNDE Cerda

## 2023-07-05 NOTE — PROGRESS NOTE ADULT - SUBJECTIVE AND OBJECTIVE BOX
Postop Day  __1_ s/p   C- Section    THERAPY:  [x  ] Spinal morphine   [  ] Epidural morphine   [  ] IV PCA Hydromorphone 1 mg/ml    acetaminophen     Tablet .. 975 milliGRAM(s) Oral <User Schedule>  diphenhydrAMINE 25 milliGRAM(s) Oral every 6 hours PRN  heparin   Injectable 44472 Unit(s) SubCutaneous every 12 hours  ibuprofen  Tablet. 600 milliGRAM(s) Oral every 6 hours  lanolin Ointment 1 Application(s) Topical every 6 hours PRN  magnesium hydroxide Suspension 30 milliLiter(s) Oral two times a day PRN  oxyCODONE    IR 5 milliGRAM(s) Oral once PRN  oxyCODONE    IR 5 milliGRAM(s) Oral every 3 hours PRN  simethicone 80 milliGRAM(s) Chew every 4 hours PRN      T(C): 36.7 (07-05-23 @ 05:23), Max: 37 (07-04-23 @ 21:21)  HR: 65 (07-05-23 @ 05:23) (61 - 100)  BP: 119/56 (07-05-23 @ 05:23) (91/54 - 119/56)  RR: 18 (07-05-23 @ 05:23) (18 - 18)  SpO2: 100% (07-05-23 @ 05:23) (100% - 100%)    Pain:   ______mild_____ at rest;  _____moderate______with activity    Sedation Score:	  [ x ] Alert	    [  ] Drowsy        [  ] Arousable	[  ] Asleep	[  ] Unresponsive    Side Effects:	  [  ] None	     [ x ] Nausea        [  ] Pruritus        [  ] Weakness   [  ] Numbness        ASSESSMENT/ PLAN  [ x  ] Side effects resolved      [ x  ] Patient made aware of PRN meds available     [ x] Discontinue & switch to PRN pain medications        [  ] Continue       Patient states lower extremities feel and move normally. No apparent anesthetic complications. For information on Fall & Injury Prevention, visit [email or web address]

## 2023-07-13 LAB — SURGICAL PATHOLOGY STUDY: SIGNIFICANT CHANGE UP

## 2023-07-17 ENCOUNTER — INPATIENT (INPATIENT)
Facility: HOSPITAL | Age: 33
LOS: 1 days | Discharge: HOME | End: 2023-07-19
Attending: OBSTETRICS & GYNECOLOGY | Admitting: OBSTETRICS & GYNECOLOGY

## 2023-07-17 VITALS
SYSTOLIC BLOOD PRESSURE: 120 MMHG | TEMPERATURE: 98 F | HEART RATE: 68 BPM | DIASTOLIC BLOOD PRESSURE: 74 MMHG | OXYGEN SATURATION: 100 % | RESPIRATION RATE: 17 BRPM

## 2023-07-17 DIAGNOSIS — O26.899 OTHER SPECIFIED PREGNANCY RELATED CONDITIONS, UNSPECIFIED TRIMESTER: ICD-10-CM

## 2023-07-17 DIAGNOSIS — Z98.891 HISTORY OF UTERINE SCAR FROM PREVIOUS SURGERY: Chronic | ICD-10-CM

## 2023-07-17 LAB
ALBUMIN SERPL ELPH-MCNC: 4.2 G/DL — SIGNIFICANT CHANGE UP (ref 3.3–5)
ALP SERPL-CCNC: 103 U/L — SIGNIFICANT CHANGE UP (ref 40–120)
ALT FLD-CCNC: 21 U/L — SIGNIFICANT CHANGE UP (ref 4–33)
ANION GAP SERPL CALC-SCNC: 14 MMOL/L — SIGNIFICANT CHANGE UP (ref 7–14)
APPEARANCE UR: CLEAR — SIGNIFICANT CHANGE UP
APTT BLD: 29.6 SEC — SIGNIFICANT CHANGE UP (ref 27–36.3)
AST SERPL-CCNC: 22 U/L — SIGNIFICANT CHANGE UP (ref 4–32)
BASOPHILS # BLD AUTO: 0.05 K/UL — SIGNIFICANT CHANGE UP (ref 0–0.2)
BASOPHILS NFR BLD AUTO: 0.8 % — SIGNIFICANT CHANGE UP (ref 0–2)
BILIRUB SERPL-MCNC: 0.6 MG/DL — SIGNIFICANT CHANGE UP (ref 0.2–1.2)
BILIRUB UR-MCNC: NEGATIVE — SIGNIFICANT CHANGE UP
BUN SERPL-MCNC: 11 MG/DL — SIGNIFICANT CHANGE UP (ref 7–23)
CALCIUM SERPL-MCNC: 9.6 MG/DL — SIGNIFICANT CHANGE UP (ref 8.4–10.5)
CHLORIDE SERPL-SCNC: 105 MMOL/L — SIGNIFICANT CHANGE UP (ref 98–107)
CO2 SERPL-SCNC: 23 MMOL/L — SIGNIFICANT CHANGE UP (ref 22–31)
COLOR SPEC: YELLOW — SIGNIFICANT CHANGE UP
CREAT ?TM UR-MCNC: 18 MG/DL — SIGNIFICANT CHANGE UP
CREAT SERPL-MCNC: 0.71 MG/DL — SIGNIFICANT CHANGE UP (ref 0.5–1.3)
DIFF PNL FLD: NEGATIVE — SIGNIFICANT CHANGE UP
EGFR: 115 ML/MIN/1.73M2 — SIGNIFICANT CHANGE UP
EOSINOPHIL # BLD AUTO: 0.28 K/UL — SIGNIFICANT CHANGE UP (ref 0–0.5)
EOSINOPHIL NFR BLD AUTO: 4.6 % — SIGNIFICANT CHANGE UP (ref 0–6)
FIBRINOGEN PPP-MCNC: 334 MG/DL — SIGNIFICANT CHANGE UP (ref 200–465)
GLUCOSE SERPL-MCNC: 83 MG/DL — SIGNIFICANT CHANGE UP (ref 70–99)
GLUCOSE UR QL: NEGATIVE MG/DL — SIGNIFICANT CHANGE UP
HCT VFR BLD CALC: 36 % — SIGNIFICANT CHANGE UP (ref 34.5–45)
HGB BLD-MCNC: 11.3 G/DL — LOW (ref 11.5–15.5)
IANC: 3.41 K/UL — SIGNIFICANT CHANGE UP (ref 1.8–7.4)
IMM GRANULOCYTES NFR BLD AUTO: 0.3 % — SIGNIFICANT CHANGE UP (ref 0–0.9)
INR BLD: 1.15 RATIO — SIGNIFICANT CHANGE UP (ref 0.88–1.16)
KETONES UR-MCNC: NEGATIVE MG/DL — SIGNIFICANT CHANGE UP
LDH SERPL L TO P-CCNC: 293 U/L — HIGH (ref 135–225)
LEUKOCYTE ESTERASE UR-ACNC: NEGATIVE — SIGNIFICANT CHANGE UP
LYMPHOCYTES # BLD AUTO: 1.99 K/UL — SIGNIFICANT CHANGE UP (ref 1–3.3)
LYMPHOCYTES # BLD AUTO: 32.4 % — SIGNIFICANT CHANGE UP (ref 13–44)
MCHC RBC-ENTMCNC: 26.9 PG — LOW (ref 27–34)
MCHC RBC-ENTMCNC: 31.4 GM/DL — LOW (ref 32–36)
MCV RBC AUTO: 85.7 FL — SIGNIFICANT CHANGE UP (ref 80–100)
MONOCYTES # BLD AUTO: 0.39 K/UL — SIGNIFICANT CHANGE UP (ref 0–0.9)
MONOCYTES NFR BLD AUTO: 6.4 % — SIGNIFICANT CHANGE UP (ref 2–14)
NEUTROPHILS # BLD AUTO: 3.41 K/UL — SIGNIFICANT CHANGE UP (ref 1.8–7.4)
NEUTROPHILS NFR BLD AUTO: 55.5 % — SIGNIFICANT CHANGE UP (ref 43–77)
NITRITE UR-MCNC: NEGATIVE — SIGNIFICANT CHANGE UP
NRBC # BLD: 0 /100 WBCS — SIGNIFICANT CHANGE UP (ref 0–0)
NRBC # FLD: 0 K/UL — SIGNIFICANT CHANGE UP (ref 0–0)
PH UR: 6.5 — SIGNIFICANT CHANGE UP (ref 5–8)
PLATELET # BLD AUTO: 340 K/UL — SIGNIFICANT CHANGE UP (ref 150–400)
POTASSIUM SERPL-MCNC: 4.1 MMOL/L — SIGNIFICANT CHANGE UP (ref 3.5–5.3)
POTASSIUM SERPL-SCNC: 4.1 MMOL/L — SIGNIFICANT CHANGE UP (ref 3.5–5.3)
PROT ?TM UR-MCNC: 4 MG/DL — SIGNIFICANT CHANGE UP
PROT ?TM UR-MCNC: <4 MG/DL — SIGNIFICANT CHANGE UP
PROT SERPL-MCNC: 8.1 G/DL — SIGNIFICANT CHANGE UP (ref 6–8.3)
PROT UR-MCNC: NEGATIVE MG/DL — SIGNIFICANT CHANGE UP
PROT/CREAT UR-RTO: 0.2 RATIO — SIGNIFICANT CHANGE UP (ref 0–0.2)
PROTHROM AB SERPL-ACNC: 13.4 SEC — SIGNIFICANT CHANGE UP (ref 10.5–13.4)
RBC # BLD: 4.2 M/UL — SIGNIFICANT CHANGE UP (ref 3.8–5.2)
RBC # FLD: 16.3 % — HIGH (ref 10.3–14.5)
SODIUM SERPL-SCNC: 142 MMOL/L — SIGNIFICANT CHANGE UP (ref 135–145)
SP GR SPEC: 1.01 — SIGNIFICANT CHANGE UP (ref 1–1.03)
URATE SERPL-MCNC: 5.4 MG/DL — SIGNIFICANT CHANGE UP (ref 2.5–7)
UROBILINOGEN FLD QL: 0.2 MG/DL — SIGNIFICANT CHANGE UP (ref 0.2–1)
WBC # BLD: 6.14 K/UL — SIGNIFICANT CHANGE UP (ref 3.8–10.5)
WBC # FLD AUTO: 6.14 K/UL — SIGNIFICANT CHANGE UP (ref 3.8–10.5)

## 2023-07-17 RX ORDER — NIFEDIPINE 30 MG
10 TABLET, EXTENDED RELEASE 24 HR ORAL ONCE
Refills: 0 | Status: COMPLETED | OUTPATIENT
Start: 2023-07-17 | End: 2023-07-17

## 2023-07-17 RX ORDER — NIFEDIPINE 30 MG
30 TABLET, EXTENDED RELEASE 24 HR ORAL ONCE
Refills: 0 | Status: DISCONTINUED | OUTPATIENT
Start: 2023-07-17 | End: 2023-07-17

## 2023-07-17 RX ORDER — MAGNESIUM SULFATE 500 MG/ML
2 VIAL (ML) INJECTION
Qty: 40 | Refills: 0 | Status: DISCONTINUED | OUTPATIENT
Start: 2023-07-17 | End: 2023-07-17

## 2023-07-17 RX ORDER — NIFEDIPINE 30 MG
30 TABLET, EXTENDED RELEASE 24 HR ORAL DAILY
Refills: 0 | Status: DISCONTINUED | OUTPATIENT
Start: 2023-07-17 | End: 2023-07-19

## 2023-07-17 RX ORDER — SODIUM CHLORIDE 9 MG/ML
1000 INJECTION, SOLUTION INTRAVENOUS
Refills: 0 | Status: DISCONTINUED | OUTPATIENT
Start: 2023-07-17 | End: 2023-07-19

## 2023-07-17 RX ORDER — MAGNESIUM SULFATE 500 MG/ML
2 VIAL (ML) INJECTION
Qty: 40 | Refills: 0 | Status: DISCONTINUED | OUTPATIENT
Start: 2023-07-17 | End: 2023-07-19

## 2023-07-17 RX ORDER — MAGNESIUM SULFATE 500 MG/ML
4 VIAL (ML) INJECTION ONCE
Refills: 0 | Status: COMPLETED | OUTPATIENT
Start: 2023-07-17 | End: 2023-07-17

## 2023-07-17 RX ADMIN — SODIUM CHLORIDE 50 MILLILITER(S): 9 INJECTION, SOLUTION INTRAVENOUS at 18:10

## 2023-07-17 RX ADMIN — Medication 50 GM/HR: at 18:46

## 2023-07-17 RX ADMIN — Medication 10 MILLIGRAM(S): at 17:17

## 2023-07-17 RX ADMIN — Medication 50 GM/HR: at 20:51

## 2023-07-17 RX ADMIN — Medication 30 MILLIGRAM(S): at 21:54

## 2023-07-17 RX ADMIN — Medication 300 GRAM(S): at 18:12

## 2023-07-17 RX ADMIN — Medication 50 GM/HR: at 18:35

## 2023-07-17 NOTE — H&P ADULT - ASSESSMENT
Admit for post partum PEC  Magnesium sulfate therapy  Procardia XL 30mg QD  Repeat HELLP labs in AM  D/W Dr. Hart

## 2023-07-17 NOTE — PATIENT PROFILE ADULT - FALL HARM RISK - UNIVERSAL INTERVENTIONS
Bed in lowest position, wheels locked, appropriate side rails in place/Call bell, personal items and telephone in reach/Instruct patient to call for assistance before getting out of bed or chair/Non-slip footwear when patient is out of bed/[place] to call system/Physically safe environment - no spills, clutter or unnecessary equipment/Purposeful Proactive Rounding/Room/bathroom lighting operational, light cord in reach

## 2023-07-17 NOTE — H&P ADULT - HISTORY OF PRESENT ILLNESS
33yo  s/p rpt  7/3. Patient was seen in office today and sent for evaluation for elevated BP and HA that has since resolved with Motrin,   At this time, denies complaints. Denies elevated BP's with any other pregnancies.     H/O C/S x 3      2/10/2017- C/S for Arrest      2019- C/S Rpt      7/3/2023- C/S Rpt

## 2023-07-17 NOTE — H&P ADULT - NSHPPHYSICALEXAM_GEN_ALL_CORE
Assessment reveals VSS, abdomen soft, NT, obese.   Incision open to air, clean, dry, intact  +2 pitting edema to upper shin bilaterally, +pedal pulses    BP range 142//72 pulse 53-74, O2 sat 100% RA    Procardia IR 10mg PO x 1

## 2023-07-18 ENCOUNTER — TRANSCRIPTION ENCOUNTER (OUTPATIENT)
Age: 33
End: 2023-07-18

## 2023-07-18 DIAGNOSIS — O16.9 UNSPECIFIED MATERNAL HYPERTENSION, UNSPECIFIED TRIMESTER: ICD-10-CM

## 2023-07-18 LAB
ALBUMIN SERPL ELPH-MCNC: 3.7 G/DL — SIGNIFICANT CHANGE UP (ref 3.3–5)
ALP SERPL-CCNC: 108 U/L — SIGNIFICANT CHANGE UP (ref 40–120)
ALT FLD-CCNC: 17 U/L — SIGNIFICANT CHANGE UP (ref 4–33)
ANION GAP SERPL CALC-SCNC: 10 MMOL/L — SIGNIFICANT CHANGE UP (ref 7–14)
APTT BLD: 30 SEC — SIGNIFICANT CHANGE UP (ref 27–36.3)
AST SERPL-CCNC: 13 U/L — SIGNIFICANT CHANGE UP (ref 4–32)
BASOPHILS # BLD AUTO: 0.04 K/UL — SIGNIFICANT CHANGE UP (ref 0–0.2)
BASOPHILS NFR BLD AUTO: 0.6 % — SIGNIFICANT CHANGE UP (ref 0–2)
BILIRUB SERPL-MCNC: 0.4 MG/DL — SIGNIFICANT CHANGE UP (ref 0.2–1.2)
BUN SERPL-MCNC: 12 MG/DL — SIGNIFICANT CHANGE UP (ref 7–23)
CALCIUM SERPL-MCNC: 8.1 MG/DL — LOW (ref 8.4–10.5)
CHLORIDE SERPL-SCNC: 105 MMOL/L — SIGNIFICANT CHANGE UP (ref 98–107)
CO2 SERPL-SCNC: 24 MMOL/L — SIGNIFICANT CHANGE UP (ref 22–31)
CREAT SERPL-MCNC: 0.74 MG/DL — SIGNIFICANT CHANGE UP (ref 0.5–1.3)
EGFR: 109 ML/MIN/1.73M2 — SIGNIFICANT CHANGE UP
EOSINOPHIL # BLD AUTO: 0.23 K/UL — SIGNIFICANT CHANGE UP (ref 0–0.5)
EOSINOPHIL NFR BLD AUTO: 3.2 % — SIGNIFICANT CHANGE UP (ref 0–6)
FIBRINOGEN PPP-MCNC: 315 MG/DL — SIGNIFICANT CHANGE UP (ref 200–465)
GLUCOSE SERPL-MCNC: 120 MG/DL — HIGH (ref 70–99)
HCT VFR BLD CALC: 37.5 % — SIGNIFICANT CHANGE UP (ref 34.5–45)
HGB BLD-MCNC: 12 G/DL — SIGNIFICANT CHANGE UP (ref 11.5–15.5)
IANC: 5.09 K/UL — SIGNIFICANT CHANGE UP (ref 1.8–7.4)
IMM GRANULOCYTES NFR BLD AUTO: 0.3 % — SIGNIFICANT CHANGE UP (ref 0–0.9)
INR BLD: 1.07 RATIO — SIGNIFICANT CHANGE UP (ref 0.88–1.16)
LDH SERPL L TO P-CCNC: 203 U/L — SIGNIFICANT CHANGE UP (ref 135–225)
LYMPHOCYTES # BLD AUTO: 1.42 K/UL — SIGNIFICANT CHANGE UP (ref 1–3.3)
LYMPHOCYTES # BLD AUTO: 19.8 % — SIGNIFICANT CHANGE UP (ref 13–44)
MAGNESIUM SERPL-MCNC: 4.5 MG/DL — HIGH (ref 1.6–2.6)
MAGNESIUM SERPL-MCNC: 5.6 MG/DL — HIGH (ref 1.6–2.6)
MAGNESIUM SERPL-MCNC: 6.2 MG/DL — HIGH (ref 1.6–2.6)
MCHC RBC-ENTMCNC: 27 PG — SIGNIFICANT CHANGE UP (ref 27–34)
MCHC RBC-ENTMCNC: 32 GM/DL — SIGNIFICANT CHANGE UP (ref 32–36)
MCV RBC AUTO: 84.5 FL — SIGNIFICANT CHANGE UP (ref 80–100)
MONOCYTES # BLD AUTO: 0.37 K/UL — SIGNIFICANT CHANGE UP (ref 0–0.9)
MONOCYTES NFR BLD AUTO: 5.2 % — SIGNIFICANT CHANGE UP (ref 2–14)
NEUTROPHILS # BLD AUTO: 5.09 K/UL — SIGNIFICANT CHANGE UP (ref 1.8–7.4)
NEUTROPHILS NFR BLD AUTO: 70.9 % — SIGNIFICANT CHANGE UP (ref 43–77)
NRBC # BLD: 0 /100 WBCS — SIGNIFICANT CHANGE UP (ref 0–0)
NRBC # FLD: 0 K/UL — SIGNIFICANT CHANGE UP (ref 0–0)
PLATELET # BLD AUTO: 331 K/UL — SIGNIFICANT CHANGE UP (ref 150–400)
POTASSIUM SERPL-MCNC: 3.5 MMOL/L — SIGNIFICANT CHANGE UP (ref 3.5–5.3)
POTASSIUM SERPL-SCNC: 3.5 MMOL/L — SIGNIFICANT CHANGE UP (ref 3.5–5.3)
PROT SERPL-MCNC: 7.2 G/DL — SIGNIFICANT CHANGE UP (ref 6–8.3)
PROTHROM AB SERPL-ACNC: 12.4 SEC — SIGNIFICANT CHANGE UP (ref 10.5–13.4)
RBC # BLD: 4.44 M/UL — SIGNIFICANT CHANGE UP (ref 3.8–5.2)
RBC # FLD: 16.3 % — HIGH (ref 10.3–14.5)
SODIUM SERPL-SCNC: 139 MMOL/L — SIGNIFICANT CHANGE UP (ref 135–145)
URATE SERPL-MCNC: 5.5 MG/DL — SIGNIFICANT CHANGE UP (ref 2.5–7)
WBC # BLD: 7.17 K/UL — SIGNIFICANT CHANGE UP (ref 3.8–10.5)
WBC # FLD AUTO: 7.17 K/UL — SIGNIFICANT CHANGE UP (ref 3.8–10.5)

## 2023-07-18 RX ORDER — ACETAMINOPHEN 500 MG
975 TABLET ORAL EVERY 6 HOURS
Refills: 0 | Status: DISCONTINUED | OUTPATIENT
Start: 2023-07-18 | End: 2023-07-19

## 2023-07-18 RX ADMIN — Medication 975 MILLIGRAM(S): at 09:15

## 2023-07-18 RX ADMIN — Medication 50 GM/HR: at 07:17

## 2023-07-18 RX ADMIN — Medication 30 MILLIGRAM(S): at 22:38

## 2023-07-18 RX ADMIN — Medication 975 MILLIGRAM(S): at 08:40

## 2023-07-18 NOTE — DISCHARGE NOTE PROVIDER - NSDCFUSCHEDAPPT_GEN_ALL_CORE_FT
Isabel Quiroga  [facility]  OPHTHALM [address] [place] [address]  Scheduled Appointment: 08/17/2023

## 2023-07-18 NOTE — DISCHARGE NOTE PROVIDER - NSDCFUADDAPPT_GEN_ALL_CORE_FT
- Continue BP meds as prescribed (hold is BP is under 110/60)  -Take blood pressure with at home cuff prior to taking medications; if BP is >150/90 call MD  - Return to hospital with headaches, visual changes, abdominal pain, nausea, vomiting, chest pain or shortness of breathe  - Follow up with OB in 2 days for BP check  - Follow up with [facility] (email sent for follow up; call [phone])

## 2023-07-18 NOTE — DISCHARGE NOTE PROVIDER - HOSPITAL COURSE
33 y/o  admitted on POD#12 for sPEC. Presented to triage with elevated BP's and c/o HA resolved with Motrin. BP's in triage 142//72, pulse 53-74bpm. Denies visual changes, n/v, CP/SOB, epigastric pain. Procardia 10mg IR given. MagSO4 gtt initiated for seizure prophylaxis. Procardia 30mg XL daily started for maintenance therapy. HELLP labs WNL.    On day of discharge, pt denies PEC symptoms. Procardia RX sent to pt's preferred pharmacy.

## 2023-07-18 NOTE — PROGRESS NOTE ADULT - SUBJECTIVE AND OBJECTIVE BOX
OB Postpartum Note:  Delivery, POD#15    S: 33yo POD#15 s/p LTCS readmitted with sPEC by BP and HA. Today patient feels well.  Pain is well controlled. She is tolerating a regular diet and passing flatus. She is voiding spontaneously, and ambulating without difficulty. Denies CP/SOB. Denies HA, change in vision, RUQ pain, lightheadedness, dizziness, or N/V.    O:  Vitals:  Vital Signs Last 24 Hrs  T(C): 36.7 (2023 06:10), Max: 36.7 (2023 20:00)  T(F): 98.1 (2023 06:10), Max: 98.1 (2023 20:00)  HR: 86 (2023 06:10) (63 - 86)  BP: 118/67 (2023 06:10) (113/74 - 134/78)  BP(mean): --  RR: 17 (2023 06:10) (16 - 17)  SpO2: 99% (2023 06:10) (99% - 100%)    Parameters below as of 2023 06:10  Patient On (Oxygen Delivery Method): room air        MEDICATIONS  (STANDING):  lactated ringers. 1000 milliLiter(s) (50 mL/Hr) IV Continuous <Continuous>  magnesium sulfate Infusion 2 Gm/Hr (50 mL/Hr) IV Continuous <Continuous>  NIFEdipine XL 30 milliGRAM(s) Oral daily    MEDICATIONS  (PRN):      LABS:  Blood type: B Positive  Rubella IgG: RPR: Negative                          12.0   7.17 >-----------< 331    (  @ 07:01 )             37.5                        11.3<L>   6.14 >-----------< 340    (  @ 15:43 )             36.0    23 @ 07:01      139  |  105  |  x   ----------------------------<  x   3.5   |  x   |  x     23 @ 15:43      142  |  105  |  11  ----------------------------<  83  4.1   |  23  |  0.71        Ca    9.6      2023 15:43  Mg     5.60<H>       Mg     4.50<H>         TPro  7.2  /  Alb  x   /  TBili  x   /  DBili  x   /  AST  x   /  ALT  x   /  AlkPhos  x   23 @ 07:01  TPro  8.1  /  Alb  4.2  /  TBili  0.6  /  DBili  x   /  AST  22  /  ALT  21  /  AlkPhos  103  23 @ 15:43          Physical exam:  Gen: NAD  CV: RRR  Pulm: CTAB  Abdomen: Soft, nontender, no distension , firm uterine fundus at umbilicus.  Incision: Clean, dry, and intact   Pelvic: Normal lochia noted  Ext: No calf tenderness or edema             OB Postpartum Note:  Delivery, POD#15    S: 33yo POD#15 s/p LTCS readmitted with sPEC by BP and HA that had resolved last night with Motrin. This morning she reports a 7/10 headache and has not received Tylenol recently. Pain is well controlled. She is tolerating a regular diet and passing flatus. She is voiding spontaneously, and ambulating without difficulty. Denies CP/SOB. Denies HA, change in vision, RUQ pain, lightheadedness, dizziness, or N/V.    O:  Vitals:  Vital Signs Last 24 Hrs  T(C): 36.7 (2023 06:10), Max: 36.7 (2023 20:00)  T(F): 98.1 (2023 06:10), Max: 98.1 (2023 20:00)  HR: 86 (2023 06:10) (63 - 86)  BP: 118/67 (2023 06:10) (113/74 - 134/78)  BP(mean): --  RR: 17 (2023 06:10) (16 - 17)  SpO2: 99% (2023 06:10) (99% - 100%)    Parameters below as of 2023 06:10  Patient On (Oxygen Delivery Method): room air        MEDICATIONS  (STANDING):  lactated ringers. 1000 milliLiter(s) (50 mL/Hr) IV Continuous <Continuous>  magnesium sulfate Infusion 2 Gm/Hr (50 mL/Hr) IV Continuous <Continuous>  NIFEdipine XL 30 milliGRAM(s) Oral daily    MEDICATIONS  (PRN):      LABS:  Blood type: B Positive  Rubella IgG: RPR: Negative                          12.0   7.17 >-----------< 331    (  @ 07:01 )             37.5                        11.3<L>   6.14 >-----------< 340    (  @ 15:43 )             36.0    23 @ 07:01      139  |  105  |  x   ----------------------------<  x   3.5   |  x   |  x     23 @ 15:43      142  |  105  |  11  ----------------------------<  83  4.1   |  23  |  0.71        Ca    9.6      2023 15:43  Mg     5.60<H>       Mg     4.50<H>         TPro  7.2  /  Alb  x   /  TBili  x   /  DBili  x   /  AST  x   /  ALT  x   /  AlkPhos  x   23 @ 07:01  TPro  8.1  /  Alb  4.2  /  TBili  0.6  /  DBili  x   /  AST  22  /  ALT  21  /  AlkPhos  103  23 @ 15:43          Physical exam:  Gen: NAD  CV: RRR  Pulm: CTAB  Abdomen: Soft, nontender, no distension , firm uterine fundus at umbilicus.  Incision: Clean, dry, and intact   Pelvic: Normal lochia noted  Ext: No calf tenderness or edema

## 2023-07-18 NOTE — DISCHARGE NOTE PROVIDER - NSDCDCMDCOMP_GEN_ALL_CORE
This document is complete and the patient is ready for discharge.
Verbalized Understanding/Straightforward: Basic instructions, no meds, no home treatment

## 2023-07-18 NOTE — PROGRESS NOTE ADULT - ASSESSMENT
A/P: 31yo  POD#3 s/p rLTCS readmitted with sPEC by BP and headache s/p Pro 10 IR () now on [id number].  Patient is now normotensive, stable and is otherwise doing well post-operatively.    #sPEC  - BP wnl, ctmq 4hrs  - HELLP wnl  - c/w Pro 30XL  - Mg for 24hrs    #Postpartum  - Continue motrin, tylenol, oxycodone PRN for pain control.  - Increase ambulation, encourage SCDs when not ambulating, Heparin for DVT ppx  - Continue regular diet    TREE Diaz PGY3 A/P: 33yo  POD#3 s/p rLTCS readmitted with sPEC by BP and headache s/p Pro 10 IR () now on [id number]. Patient has a headache this morning and is due for Tylenol. Patient is normotensive, stable and is otherwise doing well post-operatively.    #sPEC  - BP wnl, ctmq 4hrs  - HELLP wnl  - c/w Pro 30XL  - Mg for 24hrs    #Postpartum  - Continue motrin, tylenol, oxycodone PRN for pain control.  - Increase ambulation, encourage SCDs when not ambulating, Heparin for DVT ppx  - Continue regular diet    TREE Diaz PGY3

## 2023-07-18 NOTE — CHART NOTE - NSCHARTNOTEFT_GEN_A_CORE
Attending note    31 yo P3 two weeks postpartum presented to [facility] yesterday with c/o headache and elevated blood pressure during postop visit in office. Patient was admitted last night and given Procardia for severe range systolic blood pressures and started Magnesium sulfate. Patient evaluated at bedside. Patient reports headache this morning 7/10. Denies visual changes, epigastric pain, shortness of breath, chest pain. Ambulating. Tolerating diet.   VS  T 36.7  P 98  R 16  /69  O 2 sat 100% RA BP ranges overnight 118/67, 113/74, 126/71     Heent nl  abd obese, soft nontender  Neuro AAOx 3  labs: h/h 12/37.5  plts 331K   AST 13  ALT 17    31 yo POD#15 s/p RCS with postpartum pEC  P: continue Magnesium      Procardia 30mg QD     Tylenol prn     continue In house observation     Mbeauvil

## 2023-07-18 NOTE — DISCHARGE NOTE PROVIDER - CARE PROVIDER_API CALL
Keisha Kaur)  Obstetrics and Gynecology  [address] [place] [address]  Phone: [phone]  Fax: [phone]  Follow Up Time:

## 2023-07-18 NOTE — DISCHARGE NOTE PROVIDER - NSDCCPCAREPLAN_GEN_ALL_CORE_FT
PRINCIPAL DISCHARGE DIAGNOSIS  Diagnosis: Preeclampsia in postpartum period  Assessment and Plan of Treatment:      PRINCIPAL DISCHARGE DIAGNOSIS  Diagnosis: Preeclampsia in postpartum period  Assessment and Plan of Treatment: - Continue BP meds as prescribed (hold is BP is under 110/60)  -Take blood pressure with at home cuff prior to taking medications; if BP is >150/90 call MD  - Return to hospital with headaches, visual changes, abdominal pain, nausea, vomiting, chest pain or shortness of breathe  - Follow up with OB in 2 days for BP check  - Follow up with [facility] (email sent for follow up; call [phone])

## 2023-07-18 NOTE — DISCHARGE NOTE PROVIDER - NSDCMRMEDTOKEN_GEN_ALL_CORE_FT
Feosol 200 mg (65 mg elemental iron) oral tablet: 1 tab(s) orally once a day  Tylenol Extra Strength 500 mg oral tablet: 2 tab(s) orally every 8 hours   acetaminophen 325 mg oral tablet: 3 tab(s) orally every 6 hours As needed Mild Pain (1 - 3)  electronic blood pressure machine and cuff: Take your blood pressure three times per day please call doctor if elevated above 140/90  Feosol 200 mg (65 mg elemental iron) oral tablet: 1 tab(s) orally once a day  NIFEdipine 30 mg oral tablet, extended release: 1 tab(s) orally once a day

## 2023-07-19 ENCOUNTER — TRANSCRIPTION ENCOUNTER (OUTPATIENT)
Age: 33
End: 2023-07-19

## 2023-07-19 VITALS
OXYGEN SATURATION: 100 % | SYSTOLIC BLOOD PRESSURE: 118 MMHG | TEMPERATURE: 98 F | RESPIRATION RATE: 17 BRPM | HEART RATE: 84 BPM | DIASTOLIC BLOOD PRESSURE: 72 MMHG

## 2023-07-19 RX ORDER — NIFEDIPINE 30 MG
1 TABLET, EXTENDED RELEASE 24 HR ORAL
Qty: 30 | Refills: 0
Start: 2023-07-19 | End: 2023-08-17

## 2023-07-19 RX ORDER — ACETAMINOPHEN 500 MG
3 TABLET ORAL
Qty: 0 | Refills: 0 | DISCHARGE
Start: 2023-07-19

## 2023-07-19 NOTE — PROGRESS NOTE ADULT - ASSESSMENT
A/P: 33yo  POD#16 s/p rLTCS readmitted with sPEC by BP and headache s/p Pro 10 IR () now on [id number]. Patient feels well this morning. Patient is normotensive, stable and is otherwise doing well post-operatively.    #sPEC  - BP wnl, ctmq 4hrs  - HELLP wnl  - c/w Pro 30XL  - s/p Mg for 24hrs    #Postpartum  - Continue motrin, tylenol, oxycodone PRN for pain control.  - Increase ambulation, encourage SCDs when not ambulating, Heparin for DVT ppx  - Continue regular diet  - Discharge planning    TREE Diaz PGY3

## 2023-07-19 NOTE — DISCHARGE NOTE NURSING/CASE MANAGEMENT/SOCIAL WORK - PATIENT PORTAL LINK FT
You can access the FollowMyHealth Patient Portal offered by [facility] by registering at the following website: [email or web address]. By joining [employer]’s FollowMyHealth portal, you will also be able to view your health information using other applications (apps) compatible with our system.

## 2023-07-19 NOTE — PROGRESS NOTE ADULT - SUBJECTIVE AND OBJECTIVE BOX
OB Postpartum Note:  Delivery, POD#16    S: 34yo POD#16 s/p LTCS. The patient feels well. Patient had a headache yesterday resolved with Tylenol. Pain is well controlled. She is tolerating a regular diet and passing flatus. She is voiding spontaneously, and ambulating without difficulty. Denies CP/SOB. Denies lightheadedness, dizziness, or  N/V.    O:  Vitals:  Vital Signs Last 24 Hrs  T(C): 37.6 (2023 06:18), Max: 37.6 (2023 06:18)  T(F): 99.7 (2023 06:18), Max: 99.7 (2023 06:18)  HR: 82 (2023 06:18) (80 - 98)  BP: 107/59 (2023 06:18) (100/58 - 118/79)  BP(mean): --  RR: 17 (2023 06:18) (16 - 18)  SpO2: 100% (2023 06:18) (100% - 100%)    Parameters below as of 2023 06:18  Patient On (Oxygen Delivery Method): room air        MEDICATIONS  (STANDING):  lactated ringers. 1000 milliLiter(s) (50 mL/Hr) IV Continuous <Continuous>  magnesium sulfate Infusion 2 Gm/Hr (50 mL/Hr) IV Continuous <Continuous>  NIFEdipine XL 30 milliGRAM(s) Oral daily    MEDICATIONS  (PRN):  acetaminophen     Tablet .. 975 milliGRAM(s) Oral every 6 hours PRN Mild Pain (1 - 3)      LABS:  Blood type: B Positive  Rubella IgG: RPR: Negative                          12.0   7.17 >-----------< 331    (  @ 07:01 )             37.5                        11.3<L>   6.14 >-----------< 340    (  @ 15:43 )             36.0    23 @ 07:01      139  |  105  |  12  ----------------------------<  120<H>  3.5   |  24  |  0.74    23 @ 15:43      142  |  105  |  11  ----------------------------<  83  4.1   |  23  |  0.71        Ca    8.1<L>      2023 07:01  Ca    9.6      2023 15:43  Mg     6.20<H>     07-18  Mg     5.60<H>     07-18  Mg     4.50<H>     07-17    TPro  7.2  /  Alb  3.7  /  TBili  0.4  /  DBili  x   /  AST  13  /  ALT  17  /  AlkPhos  108  23 @ 07:01  TPro  8.1  /  Alb  4.2  /  TBili  0.6  /  DBili  x   /  AST  22  /  ALT  21  /  AlkPhos  103  23 @ 15:43          Physical exam:  Gen: NAD  CV: RRR  Pulm: CTAB  Abdomen: Soft, nontender, no distension , firm uterine fundus at umbilicus.  Incision: Clean, dry, and intact   Pelvic: Normal lochia noted  Ext: No calf tenderness or edema

## 2023-07-19 NOTE — DISCHARGE NOTE NURSING/CASE MANAGEMENT/SOCIAL WORK - NSDCPEFALRISK_GEN_ALL_CORE
For information on Fall & Injury Prevention, visit: [email or web address] OR  [email or web address] OR  https://www.cdc.gov/steadi/patient.html

## 2023-07-19 NOTE — DISCHARGE NOTE NURSING/CASE MANAGEMENT/SOCIAL WORK - NSDCFUADDAPPT_GEN_ALL_CORE_FT
- Continue BP meds as prescribed (hold is BP is under 110/60)  -Take blood pressure with at home cuff prior to taking medications; if BP is >150/90 call MD  - Return to hospital with headaches, visual changes, abdominal pain, nausea, vomiting, chest pain or shortness of breathe  - Follow up with OB in 2 days for BP check  - Follow up with [facility] (email sent for follow up; call [phone])

## 2023-07-19 NOTE — DISCHARGE NOTE NURSING/CASE MANAGEMENT/SOCIAL WORK - NSDCVIVACCINE_GEN_ALL_CORE_FT
influenza, injectable, quadrivalent, preservative free; 14-Sep-2019 19:35; Patt Morales (RN); [employer]; [id number] (Exp. Date: 30-Jun-2020); IntraMuscular; Deltoid Right.; 0.5 milliLiter(s); VIS (VIS Published: 15-Aug-2019, VIS Presented: 14-Sep-2019);   Tdap; 15-Sep-2019 09:40; Betzy Colon (RN); [employer]; [id number] (Exp. Date: 07-Jul-2021); IntraMuscular; Deltoid Right.; 0.5 milliLiter(s); VIS (VIS Published: 09-May-2013, VIS Presented: 15-Sep-2019);   Tdap; 04-Jul-2023 06:40; Tristan Boo (RN); [employer]; [id number] (Exp. Date: 08-Mar-2025); IntraMuscular; Deltoid Left.; 0.5 milliLiter(s); VIS (VIS Published: 09-May-2013, VIS Presented: 04-Jul-2023);

## 2023-07-20 PROBLEM — D50.9 IRON DEFICIENCY ANEMIA, UNSPECIFIED: Chronic | Status: ACTIVE | Noted: 2023-06-30

## 2023-07-20 PROBLEM — E66.01 MORBID (SEVERE) OBESITY DUE TO EXCESS CALORIES: Chronic | Status: ACTIVE | Noted: 2023-06-30

## 2023-08-01 ENCOUNTER — APPOINTMENT (OUTPATIENT)
Dept: CARDIOLOGY | Facility: CLINIC | Age: 33
End: 2023-08-01
Payer: MEDICAID

## 2023-08-01 DIAGNOSIS — O13.9 GESTATIONAL [PREGNANCY-INDUCED] HYPERTENSION W/OUT SIGNIFICANT PROTEINURIA, UNSPECIFIED TRIMESTER: ICD-10-CM

## 2023-08-01 PROCEDURE — 99214 OFFICE O/P EST MOD 30 MIN: CPT | Mod: 95

## 2023-08-04 PROBLEM — O13.9 GESTATIONAL HYPERTENSION: Status: ACTIVE | Noted: 2023-08-04

## 2023-08-04 NOTE — HISTORY OF PRESENT ILLNESS
[Home] : at home, [unfilled] , at the time of the visit. [Other Location: e.g. Home (Enter Location, City,State)___] : at [unfilled] [Verbal consent obtained from patient] : the patient, [unfilled] [FreeTextEntry1] : Ms. Hill is a 33 year old female that [facility] a cardiovascular evaluation.and blood pressure management postpartum.  She recently delivered a full term baby with a scheduled  section in the setting of gestational diabetes.   Pregnancy history 1st pregnancy-   , full term,  section due to faiure to progress  2nd pregnancy- , full term, repeat  section-> developed gestational diabetes  3rd pregnancy- July 3, 2023, full term, repeat  section complicated by repeat gestational diabetes. Readmitted on 2023 with symptoms of hypertension and headache. Diagnosed with preeclampsia and gestaiton hypertension. Treated with IV Magnesium X 24 hours and treated with oral Nifedipine ER 30 mg QD. Discharged to home.  Blood pressure today 120/82 on Nifedipine   Feels well and denies any issues with shortness of breath, chest discomfort or palpitations.

## 2023-08-04 NOTE — ASSESSMENT
[FreeTextEntry1] : Ms. Hill is a 33 year old female that presents [facility] for a cardiovascular evaluation.and blood pressure management postpartum.  She recently delivered a full term baby with a scheduled  section in the setting of gestational diabetes.   #Gestational Hypertension   Readmitted on 2023 with symptoms of hypertension and headache. Diagnosed with preeclampsia and    gestaiton hypertension.  Treated with IV Magnesium X 24 hours and treated with oral Nifedipine ER 30 mg QD.   Discharged to home.   Blood pressure today 120/82 on Nifedipine    Requested patient to send one week BP log for review and assistance with titration of antihypertension.  #Adverse Cardiovascular Risk Factors  Personal history of gestational hypertension, postppartum preeclampsia Family history of hypertension  Recommending a baseline cardiovascular evaluation 3 months postpartum to assess risk In-office physical examination and 12 lead EKG Echocardiogram to assess cardiac structure and function Exercise stress testing to assess functional status Full blood work panel:  CBC,CMP, Hgb A1C, TSH, Lipid profile  - Encouraged patient to participate in  healthy exercise (when cleared by OB) and eating habits, focusing on a Mediterranean style of eating and aiming for the recommended 150 minutes per week of moderate physical activity.  - Encouraged the patient to find healthy outlets and coping mechanisms to help manage stress, such as physical activity/exercise, reducing workload if possible, spending time with family and friends, engaging in an enjoyable hobby, or using meditation or mindfulness techniques.

## 2023-08-07 PROBLEM — O24.419 GESTATIONAL DIABETES MELLITUS IN PREGNANCY, UNSPECIFIED CONTROL: Chronic | Status: ACTIVE | Noted: 2023-06-30

## 2023-08-10 NOTE — PACU DISCHARGE NOTE - PAIN:
Controlled with current regime
08-10    131<L>  |  92<L>  |  73<H>  ----------------------------<  108<H>  3.3<L>   |  25  |  1.74<H>    Ca    8.7      10 Aug 2023 06:02  Phos  3.6     08-10  Mg     2.50     08-10    A1C with Estimated Average Glucose Result: 5.7 % (05-18-23 @ 04:00)

## 2023-08-17 ENCOUNTER — APPOINTMENT (OUTPATIENT)
Dept: OPHTHALMOLOGY | Facility: CLINIC | Age: 33
End: 2023-08-17
Payer: MEDICAID

## 2023-08-17 ENCOUNTER — NON-APPOINTMENT (OUTPATIENT)
Age: 33
End: 2023-08-17

## 2023-08-17 PROCEDURE — 92004 COMPRE OPH EXAM NEW PT 1/>: CPT

## 2023-08-18 NOTE — DISCHARGE NOTE OB - AVOID SITTING IN ONE POSITION FOR MORE THAN ONE HOUR
Please call & inform patient:  1.  Electrolytes are normal, calcium low, stable, creatinine gradually improved with GFR stable, both improved from 2 weeks ago  2.  White count is resolved, red cell count low with low hemoglobin and hematocrit, known anemia, elevated platelets downtrending slightly  3.  Magnesium remains stable  Please send copy of labs to patient's nephrologist, cardiologist, oncologist, thank you  Thank you,  JUAN CARLOS Min Statement Selected

## 2023-11-15 NOTE — OB RN PATIENT PROFILE - ALERT: PERTINENT HISTORY
Refill:    Tadalafil 20mg take one tab prn    Pharm: [facility] # [phone]    LR: 09/19/23 qty 18 no refills  LV: 06/13/23  NV: 12/05/23   20 Week Level II Sonogram/1st Trimester Sonogram/Ultra Screen at 12 Weeks/Non Invasive Prenatal Screen (NIPS)

## 2023-11-22 ENCOUNTER — APPOINTMENT (OUTPATIENT)
Dept: CV DIAGNOSITCS | Facility: HOSPITAL | Age: 33
End: 2023-11-22

## 2023-11-22 ENCOUNTER — APPOINTMENT (OUTPATIENT)
Dept: CARDIOLOGY | Facility: CLINIC | Age: 33
End: 2023-11-22
Payer: MEDICAID

## 2023-11-22 VITALS
DIASTOLIC BLOOD PRESSURE: 79 MMHG | HEART RATE: 67 BPM | OXYGEN SATURATION: 100 % | HEIGHT: 64 IN | SYSTOLIC BLOOD PRESSURE: 112 MMHG | BODY MASS INDEX: 34.15 KG/M2 | WEIGHT: 200 LBS

## 2023-11-22 DIAGNOSIS — O24.419 GESTATIONAL DIABETES MELLITUS IN PREGNANCY, UNSPECIFIED CONTROL: ICD-10-CM

## 2023-11-22 PROCEDURE — 93000 ELECTROCARDIOGRAM COMPLETE: CPT

## 2023-11-22 PROCEDURE — 99204 OFFICE O/P NEW MOD 45 MIN: CPT | Mod: 25

## 2023-11-22 RX ORDER — PRENATAL WITH FERROUS FUM AND FOLIC ACID 3080; 920; 120; 400; 22; 1.84; 3; 20; 10; 1; 12; 200; 27; 25; 2 [IU]/1; [IU]/1; MG/1; [IU]/1; MG/1; MG/1; MG/1; MG/1; MG/1; MG/1; UG/1; MG/1; MG/1; MG/1; MG/1
27-1 TABLET ORAL
Refills: 0 | Status: ACTIVE | COMMUNITY
Start: 2023-11-22

## 2024-01-09 NOTE — OB RN PATIENT PROFILE - FALL HARM RISK - UNIVERSAL INTERVENTIONS
No
Bed in lowest position, wheels locked, appropriate side rails in place/Call bell, personal items and telephone in reach/Instruct patient to call for assistance before getting out of bed or chair/Non-slip footwear when patient is out of bed/[place] to call system/Physically safe environment - no spills, clutter or unnecessary equipment/Purposeful Proactive Rounding/Room/bathroom lighting operational, light cord in reach

## 2024-01-15 NOTE — OB PROVIDER H&P - NS_PARA_OBGYN_ALL_OB_NU
2
I, Kyle Lambert DO, personally made/approved the management plan and take responsibility for the patient management. I performed the initial face to face bedside interview with this patient regarding history of present illness, review of symptoms and relevant past medical, social and family history.  I completed an independent physical examination.  I was the initial provider who evaluated this patient. I have signed out the follow up of any pending tests (i.e. labs, radiological studies) to the resident.  I have communicated the patient’s plan of care and disposition with the resident.  The history, relevant review of systems, past medical and surgical history, medical decision making, and physical examination was documented by the scribe in my presence and I attest to the accuracy of the documentation.

## 2024-05-08 NOTE — OB RN DELIVERY SUMMARY - NS_PLACENTA_OBGYN_ALL_OB_DT
Are Labs Available For Review?: Yes Lower Range (In Mg/Kg): 120 Xerosis Normal Treatment: I recommended application of Cetaphil or CeraVe numerous times a day going to bed to all dry areas. Dosing Month 6 (Required For Cumulative Dosing): 40mg BID Kilograms Preamble Statement (Weight Entered In Details Tab): Reported Weight in kilograms: Retinoid Dermatitis Normal Treatment: I recommended more frequent application of Cetaphil or CeraVe to the areas of dermatitis. Hypertriglyceridemia Treatment: I explained this is common when taking isotretinoin. If this worsens they will contact us. They may try OTC ibuprofen. Male Completion Statement: After discussing his treatment course we decided to discontinue isotretinoin therapy at this time. He shouldn't donate blood for one month after the last dose. He should call with any new symptoms of depression. Female Completion Statement: After discussing her treatment course we decided to discontinue isotretinoin therapy at this time. I explained that she would need to continue her birth control methods for at least one month after the last dosage. She should also get a pregnancy test one month after the last dose. She shouldn't donate blood for one month after the last dose. She should call with any new symptoms of depression. Any Depression?: No Headache Monitoring: I recommended monitoring the headaches for now. There is no evidence of increased intracranial pressure. They were instructed to call if the headaches are worsening. Nosebleeds Normal Treatment: I explained this is common when taking isotretinoin. I recommended saline mist in each nostril multiple times a day. If this worsens they will contact us. Upper Range (In Mg/Kg): 150 Xerosis Aggressive Treatment: I recommended application of Cetaphil or CeraVe numerous times a day going to bed to all dry areas. I also prescribed a topical steroid for twice daily use. Retinoid Dermatitis Aggressive Treatment: I recommended more frequent application of Cetaphil or CeraVe to the areas of dermatitis. I also prescribed a topical steroid for twice daily use until the dermatitis resolves. Is Cheilitis Present?: Yes - Normal Treatment Myalgia Monitoring: I explained this is common when taking isotretinoin. If this worsens they will contact us. Weight Units: pounds Hypercholesterolemia Monitoring: I explained this is common when taking isotretinoin. We will monitor closely. Next Month's Dosage: Discontinue Months Of Therapy Completed: 6 Cheilitis Aggressive Treatment: I recommended application of Vaseline or Aquaphor numerous times a day (as often as every hour) and before going to bed. I also prescribed a topical steroid for twice daily use. Target Cumulative Dosage (In Mg/Kg): 135 Cheilitis Normal Treatment: I recommended application of Vaseline or Aquaphor numerous times a day (as often as every hour) and before going to bed. Counseling Text: I reviewed the side effect in detail. Patient should get monthly blood tests, not donate blood, not drive at night if vision affected, and not share medication. Xerosis Normal Treatment: I recommended application of Cetaphil or CeraVe numerous times a day and before going to bed to all dry areas. Detail Level: Zone Xerosis Aggressive Treatment: I recommended application of Cetaphil or CeraVe numerous times a day and before going to bed to all dry areas. I also prescribed a topical steroid for twice daily use. Female Pregnancy Counseling Text: Female patients should also be on two forms of birth control while taking this medication and for one month after their last dose. 03-Jul-2023 14:52 What Is The Patient's Gender: Female Use Therapeutic Ranged Or Therapeutic Target: please select Range or Target Pounds Preamble Statement (Weight Entered In Details Tab): Reported Weight in pounds:

## 2024-07-31 NOTE — OB RN INTRAOPERATIVE NOTE - NS_ELECTROCAUTCOAG_OBGYN_ALL_OB_FT
Learning About Lung Cancer Screening  What is screening for lung cancer?     Lung cancer screening is a way to find some lung cancers early, before a person has any symptoms of the cancer.  Lung cancer screening may help those who have the highest risk for lung cancer--people age 50 and older who are or were heavy smokers. For most people, who aren't at increased risk, screening for lung cancer probably isn't helpful.  Screening won't prevent cancer. And it may not find all lung cancers. Lung cancer screening may lower the risk of dying from lung cancer in a small number of people.  How is it done?  Lung cancer screening is done with a low-dose CT (computed tomography) scan. A CT scan uses X-rays, or radiation, to make detailed pictures of your body. Experts recommend that screening be done in medical centers that focus on finding and treating lung cancer.  Who is screening recommended for?  Lung cancer screening is recommended for people age 50 and older who are or were heavy smokers. That means people with a smoking history of at least 20 pack years. A pack year is a way to measure how heavy a smoker you are or were.  To figure out your pack years, multiply how many packs a day on average (assuming 20 cigarettes per pack) you have smoked by how many years you have smoked. For example:  If you smoked 1 pack a day for 20 years, that's 1 times 20. So you have a smoking history of 20 pack years.  If you smoked 2 packs a day for 10 years, that's 2 times 10. So you have a smoking history of 20 pack years.  Experts agree that screening is for people who have a high risk of lung cancer. But experts don't agree on what high risk means. Some say people age 50 or older with at least a 20-pack-year smoking history are high risk. Others say it's people age 55 or older with a 30-pack-year history.  To see if you could benefit from screening, first find out if you are at high risk for lung cancer. Your doctor can help you  40

## 2024-09-18 NOTE — PATIENT PROFILE ADULT - FUNCTIONAL ASSESSMENT - DAILY ACTIVITY ASSESSMENT TYPE
Quality 137: Melanoma: Continuity Of Care - Recall System: Patient information entered into a recall system that includes: target date for the next exam specified AND a process to follow up with patients regarding missed or unscheduled appointments Quality 226: Preventive Care And Screening: Tobacco Use: Screening And Cessation Intervention: Tobacco Screening not Performed Quality 47: Advance Care Plan: Advance care planning not documented, reason not otherwise specified. Detail Level: Detailed Admission

## 2024-11-22 ENCOUNTER — APPOINTMENT (OUTPATIENT)
Dept: CARDIOLOGY | Facility: CLINIC | Age: 34
End: 2024-11-22

## 2025-01-23 NOTE — OB RN PATIENT PROFILE - MENTAL HEALTH CONDITIONS/SYMPTOMS, PROFILE
[Follow-Up] : a follow-up visit [Asthma] : asthma none [COPD] : COPD [Pulmonary Nodules] : pulmonary nodules

## 2025-01-28 NOTE — OB RN DELIVERY SUMMARY - BABY A: APGAR 1 MIN MUSCLE TONE, DELIVERY
PUSH LOTS OF CLEAR FLUIDS AND MINIMIZE DAIRY PRODUCTS TILL BETTER.       SUGGEST TAKING A PROBIOTIC IN CONJUNCTION WITH YOUR ANTIBIOTIC PRESCRIPTION.   TAKE THE TWO AT LEAST 1-2 HOURS APART.       ALIGN IS GOOD BRAND OF PROBIOTIC   
(2) well flexed

## 2025-04-04 NOTE — OB PROVIDER DELIVERY SUMMARY - NSADDITIONALPROC_OBGYN_ALL_OB
No Detail Level: Detailed Continue Regimen: Doryx once daily\\nArazlo at bedtime\\nIf she isn’t clear in 3 months will discuss starting Accutane Initiate Treatment: BPO wash every morning Continue Regimen: Ketoconazole shampoo 2-3x weekly

## 2025-07-14 NOTE — PATIENT PROFILE ADULT - IS PATIENT PREGNANT?
[Dear  ___] : Dear  [unfilled], [Consult Letter:] : I had the pleasure of evaluating your patient, [unfilled]. [Please see my note below.] : Please see my note below. [Consult Closing:] : Thank you very much for allowing me to participate in the care of this patient.  If you have any questions, please do not hesitate to contact me. [Sincerely,] : Sincerely, [FreeTextEntry3] : Tarun Cabrera MD, FACS  no

## (undated) DEVICE — LIGASURE IMPACT